# Patient Record
Sex: FEMALE | Race: WHITE | HISPANIC OR LATINO | Employment: FULL TIME | URBAN - METROPOLITAN AREA
[De-identification: names, ages, dates, MRNs, and addresses within clinical notes are randomized per-mention and may not be internally consistent; named-entity substitution may affect disease eponyms.]

---

## 2017-11-24 ENCOUNTER — APPOINTMENT (OUTPATIENT)
Dept: RADIOLOGY | Facility: MEDICAL CENTER | Age: 27
DRG: 330 | End: 2017-11-24
Attending: EMERGENCY MEDICINE
Payer: MEDICAID

## 2017-11-24 ENCOUNTER — HOSPITAL ENCOUNTER (INPATIENT)
Facility: MEDICAL CENTER | Age: 27
LOS: 5 days | DRG: 330 | End: 2017-11-30
Attending: EMERGENCY MEDICINE | Admitting: SURGERY
Payer: MEDICAID

## 2017-11-24 LAB
ALBUMIN SERPL BCP-MCNC: 3.6 G/DL (ref 3.2–4.9)
ALBUMIN/GLOB SERPL: 0.9 G/DL
ALP SERPL-CCNC: 81 U/L (ref 30–99)
ALT SERPL-CCNC: 10 U/L (ref 2–50)
ANION GAP SERPL CALC-SCNC: 10 MMOL/L (ref 0–11.9)
APPEARANCE UR: ABNORMAL
AST SERPL-CCNC: 12 U/L (ref 12–45)
BACTERIA #/AREA URNS HPF: ABNORMAL /HPF
BASOPHILS # BLD AUTO: 0.4 % (ref 0–1.8)
BASOPHILS # BLD: 0.05 K/UL (ref 0–0.12)
BILIRUB SERPL-MCNC: 0.4 MG/DL (ref 0.1–1.5)
BILIRUB UR QL STRIP.AUTO: NEGATIVE
BUN SERPL-MCNC: 9 MG/DL (ref 8–22)
CALCIUM SERPL-MCNC: 9.6 MG/DL (ref 8.5–10.5)
CHLORIDE SERPL-SCNC: 101 MMOL/L (ref 96–112)
CO2 SERPL-SCNC: 23 MMOL/L (ref 20–33)
COLOR UR: YELLOW
CREAT SERPL-MCNC: 0.64 MG/DL (ref 0.5–1.4)
EOSINOPHIL # BLD AUTO: 0.25 K/UL (ref 0–0.51)
EOSINOPHIL NFR BLD: 1.9 % (ref 0–6.9)
EPI CELLS #/AREA URNS HPF: ABNORMAL /HPF
ERYTHROCYTE [DISTWIDTH] IN BLOOD BY AUTOMATED COUNT: 41.6 FL (ref 35.9–50)
GFR SERPL CREATININE-BSD FRML MDRD: >60 ML/MIN/1.73 M 2
GLOBULIN SER CALC-MCNC: 4.2 G/DL (ref 1.9–3.5)
GLUCOSE SERPL-MCNC: 80 MG/DL (ref 65–99)
GLUCOSE UR STRIP.AUTO-MCNC: NEGATIVE MG/DL
HCG UR QL: POSITIVE
HCT VFR BLD AUTO: 37.3 % (ref 37–47)
HGB BLD-MCNC: 12.9 G/DL (ref 12–16)
HYALINE CASTS #/AREA URNS LPF: ABNORMAL /LPF
IMM GRANULOCYTES # BLD AUTO: 0.06 K/UL (ref 0–0.11)
IMM GRANULOCYTES NFR BLD AUTO: 0.5 % (ref 0–0.9)
KETONES UR STRIP.AUTO-MCNC: NEGATIVE MG/DL
LEUKOCYTE ESTERASE UR QL STRIP.AUTO: ABNORMAL
LIPASE SERPL-CCNC: 18 U/L (ref 11–82)
LYMPHOCYTES # BLD AUTO: 1.45 K/UL (ref 1–4.8)
LYMPHOCYTES NFR BLD: 11 % (ref 22–41)
MCH RBC QN AUTO: 31.7 PG (ref 27–33)
MCHC RBC AUTO-ENTMCNC: 34.6 G/DL (ref 33.6–35)
MCV RBC AUTO: 91.6 FL (ref 81.4–97.8)
MICRO URNS: ABNORMAL
MONOCYTES # BLD AUTO: 1.05 K/UL (ref 0–0.85)
MONOCYTES NFR BLD AUTO: 8 % (ref 0–13.4)
NEUTROPHILS # BLD AUTO: 10.28 K/UL (ref 2–7.15)
NEUTROPHILS NFR BLD: 78.2 % (ref 44–72)
NITRITE UR QL STRIP.AUTO: NEGATIVE
NRBC # BLD AUTO: 0 K/UL
NRBC BLD AUTO-RTO: 0 /100 WBC
PH UR STRIP.AUTO: 7.5 [PH]
PLATELET # BLD AUTO: 217 K/UL (ref 164–446)
PMV BLD AUTO: 12 FL (ref 9–12.9)
POTASSIUM SERPL-SCNC: 3.3 MMOL/L (ref 3.6–5.5)
PROT SERPL-MCNC: 7.8 G/DL (ref 6–8.2)
PROT UR QL STRIP: NEGATIVE MG/DL
RBC # BLD AUTO: 4.07 M/UL (ref 4.2–5.4)
RBC # URNS HPF: ABNORMAL /HPF
RBC UR QL AUTO: NEGATIVE
SODIUM SERPL-SCNC: 134 MMOL/L (ref 135–145)
SP GR UR REFRACTOMETRY: 1
SP GR UR STRIP.AUTO: 1.01
UROBILINOGEN UR STRIP.AUTO-MCNC: 0.2 MG/DL
WBC # BLD AUTO: 13.1 K/UL (ref 4.8–10.8)
WBC #/AREA URNS HPF: ABNORMAL /HPF

## 2017-11-24 PROCEDURE — 96374 THER/PROPH/DIAG INJ IV PUSH: CPT

## 2017-11-24 PROCEDURE — 501571 HCHG TROCAR, SEPARATOR 12X100: Performed by: SURGERY

## 2017-11-24 PROCEDURE — 501583 HCHG TROCAR, THRD CAN&SEAL 5X100: Performed by: SURGERY

## 2017-11-24 PROCEDURE — 160048 HCHG OR STATISTICAL LEVEL 1-5: Performed by: SURGERY

## 2017-11-24 PROCEDURE — 500514 HCHG ENDOCLIP: Performed by: SURGERY

## 2017-11-24 PROCEDURE — 700101 HCHG RX REV CODE 250

## 2017-11-24 PROCEDURE — 160002 HCHG RECOVERY MINUTES (STAT): Performed by: SURGERY

## 2017-11-24 PROCEDURE — 501570 HCHG TROCAR, SEPARATOR: Performed by: SURGERY

## 2017-11-24 PROCEDURE — 88307 TISSUE EXAM BY PATHOLOGIST: CPT

## 2017-11-24 PROCEDURE — 85025 COMPLETE CBC W/AUTO DIFF WBC: CPT

## 2017-11-24 PROCEDURE — 160009 HCHG ANES TIME/MIN: Performed by: SURGERY

## 2017-11-24 PROCEDURE — 160028 HCHG SURGERY MINUTES - 1ST 30 MINS LEVEL 3: Performed by: SURGERY

## 2017-11-24 PROCEDURE — 501463 HCHG STAPLES, UNIV. ROTIC: Performed by: SURGERY

## 2017-11-24 PROCEDURE — 501838 HCHG SUTURE GENERAL: Performed by: SURGERY

## 2017-11-24 PROCEDURE — 502571 HCHG PACK, LAP CHOLE: Performed by: SURGERY

## 2017-11-24 PROCEDURE — 160035 HCHG PACU - 1ST 60 MINS PHASE I: Performed by: SURGERY

## 2017-11-24 PROCEDURE — 700101 HCHG RX REV CODE 250: Performed by: EMERGENCY MEDICINE

## 2017-11-24 PROCEDURE — 76815 OB US LIMITED FETUS(S): CPT

## 2017-11-24 PROCEDURE — 700111 HCHG RX REV CODE 636 W/ 250 OVERRIDE (IP)

## 2017-11-24 PROCEDURE — A6402 STERILE GAUZE <= 16 SQ IN: HCPCS | Performed by: SURGERY

## 2017-11-24 PROCEDURE — 160003 HCHG RECOVERY MINUTES (EXTENDED) STAT: Performed by: SURGERY

## 2017-11-24 PROCEDURE — 160039 HCHG SURGERY MINUTES - EA ADDL 1 MIN LEVEL 3: Performed by: SURGERY

## 2017-11-24 PROCEDURE — 501435 HCHG STAPLER, LINEAR 60: Performed by: SURGERY

## 2017-11-24 PROCEDURE — 36415 COLL VENOUS BLD VENIPUNCTURE: CPT

## 2017-11-24 PROCEDURE — 501452 HCHG STAPLES, GIA MULTIFIRE 60/80: Performed by: SURGERY

## 2017-11-24 PROCEDURE — 83690 ASSAY OF LIPASE: CPT

## 2017-11-24 PROCEDURE — 87086 URINE CULTURE/COLONY COUNT: CPT

## 2017-11-24 PROCEDURE — 80053 COMPREHEN METABOLIC PANEL: CPT

## 2017-11-24 PROCEDURE — 501433 HCHG STAPLER, GIA MULTIFIRE 60/80: Performed by: SURGERY

## 2017-11-24 PROCEDURE — 99291 CRITICAL CARE FIRST HOUR: CPT

## 2017-11-24 PROCEDURE — 160036 HCHG PACU - EA ADDL 30 MINS PHASE I: Performed by: SURGERY

## 2017-11-24 PROCEDURE — 501399 HCHG SPECIMAN BAG, ENDO CATC: Performed by: SURGERY

## 2017-11-24 PROCEDURE — 500868 HCHG NEEDLE, SURGI(VARES): Performed by: SURGERY

## 2017-11-24 PROCEDURE — 81025 URINE PREGNANCY TEST: CPT

## 2017-11-24 PROCEDURE — 81001 URINALYSIS AUTO W/SCOPE: CPT

## 2017-11-24 RX ORDER — CEFOTETAN DISODIUM 1 G/10ML
1 INJECTION, POWDER, FOR SOLUTION INTRAMUSCULAR; INTRAVENOUS ONCE
Status: COMPLETED | OUTPATIENT
Start: 2017-11-24 | End: 2017-11-24

## 2017-11-24 RX ORDER — MAGNESIUM HYDROXIDE 1200 MG/15ML
LIQUID ORAL
Status: DISCONTINUED | OUTPATIENT
Start: 2017-11-24 | End: 2017-11-25

## 2017-11-24 RX ADMIN — CEFOTETAN DISODIUM 1 G: 1 INJECTION, POWDER, FOR SOLUTION INTRAMUSCULAR; INTRAVENOUS at 22:33

## 2017-11-24 ASSESSMENT — PAIN SCALES - GENERAL: PAINLEVEL_OUTOF10: 8

## 2017-11-25 PROBLEM — K35.33 APPENDICITIS WITH ABSCESS: Status: ACTIVE | Noted: 2017-11-25

## 2017-11-25 PROCEDURE — A9270 NON-COVERED ITEM OR SERVICE: HCPCS

## 2017-11-25 PROCEDURE — 0DTH0ZZ RESECTION OF CECUM, OPEN APPROACH: ICD-10-PCS | Performed by: SURGERY

## 2017-11-25 PROCEDURE — 700102 HCHG RX REV CODE 250 W/ 637 OVERRIDE(OP): Performed by: NURSE PRACTITIONER

## 2017-11-25 PROCEDURE — 0DJD4ZZ INSPECTION OF LOWER INTESTINAL TRACT, PERCUTANEOUS ENDOSCOPIC APPROACH: ICD-10-PCS | Performed by: SURGERY

## 2017-11-25 PROCEDURE — 700102 HCHG RX REV CODE 250 W/ 637 OVERRIDE(OP)

## 2017-11-25 PROCEDURE — 770006 HCHG ROOM/CARE - MED/SURG/GYN SEMI*

## 2017-11-25 PROCEDURE — A9270 NON-COVERED ITEM OR SERVICE: HCPCS | Performed by: NURSE PRACTITIONER

## 2017-11-25 PROCEDURE — 700105 HCHG RX REV CODE 258: Performed by: NURSE PRACTITIONER

## 2017-11-25 PROCEDURE — 700111 HCHG RX REV CODE 636 W/ 250 OVERRIDE (IP): Performed by: NURSE PRACTITIONER

## 2017-11-25 PROCEDURE — 700111 HCHG RX REV CODE 636 W/ 250 OVERRIDE (IP)

## 2017-11-25 PROCEDURE — 94760 N-INVAS EAR/PLS OXIMETRY 1: CPT

## 2017-11-25 PROCEDURE — 0DTB0ZZ RESECTION OF ILEUM, OPEN APPROACH: ICD-10-PCS | Performed by: SURGERY

## 2017-11-25 PROCEDURE — 0DTJ0ZZ RESECTION OF APPENDIX, OPEN APPROACH: ICD-10-PCS | Performed by: SURGERY

## 2017-11-25 RX ORDER — SODIUM CHLORIDE 9 MG/ML
INJECTION, SOLUTION INTRAVENOUS
Status: ACTIVE
Start: 2017-11-25 | End: 2017-11-25

## 2017-11-25 RX ORDER — ONDANSETRON 2 MG/ML
4 INJECTION INTRAMUSCULAR; INTRAVENOUS EVERY 4 HOURS PRN
Status: DISCONTINUED | OUTPATIENT
Start: 2017-11-25 | End: 2017-11-30 | Stop reason: HOSPADM

## 2017-11-25 RX ORDER — DEXAMETHASONE SODIUM PHOSPHATE 4 MG/ML
4 INJECTION, SOLUTION INTRA-ARTICULAR; INTRALESIONAL; INTRAMUSCULAR; INTRAVENOUS; SOFT TISSUE
Status: DISCONTINUED | OUTPATIENT
Start: 2017-11-25 | End: 2017-11-30 | Stop reason: HOSPADM

## 2017-11-25 RX ORDER — DIPHENHYDRAMINE HYDROCHLORIDE 50 MG/ML
25 INJECTION INTRAMUSCULAR; INTRAVENOUS EVERY 6 HOURS PRN
Status: DISCONTINUED | OUTPATIENT
Start: 2017-11-25 | End: 2017-11-30 | Stop reason: HOSPADM

## 2017-11-25 RX ORDER — MORPHINE SULFATE 4 MG/ML
1-4 INJECTION, SOLUTION INTRAMUSCULAR; INTRAVENOUS
Status: DISCONTINUED | OUTPATIENT
Start: 2017-11-25 | End: 2017-11-30 | Stop reason: HOSPADM

## 2017-11-25 RX ORDER — SODIUM CHLORIDE 9 MG/ML
INJECTION, SOLUTION INTRAVENOUS CONTINUOUS
Status: DISCONTINUED | OUTPATIENT
Start: 2017-11-25 | End: 2017-11-30 | Stop reason: HOSPADM

## 2017-11-25 RX ORDER — ACETAMINOPHEN 500 MG
1000 TABLET ORAL EVERY 6 HOURS
Status: DISCONTINUED | OUTPATIENT
Start: 2017-11-25 | End: 2017-11-25

## 2017-11-25 RX ORDER — HALOPERIDOL 5 MG/ML
1 INJECTION INTRAMUSCULAR EVERY 6 HOURS PRN
Status: DISCONTINUED | OUTPATIENT
Start: 2017-11-25 | End: 2017-11-30 | Stop reason: HOSPADM

## 2017-11-25 RX ORDER — ACETAMINOPHEN 500 MG
1000 TABLET ORAL EVERY 6 HOURS
Status: DISPENSED | OUTPATIENT
Start: 2017-11-25 | End: 2017-11-30

## 2017-11-25 RX ORDER — SODIUM CHLORIDE 9 MG/ML
INJECTION, SOLUTION INTRAVENOUS
Status: ACTIVE
Start: 2017-11-25 | End: 2017-11-26

## 2017-11-25 RX ORDER — SCOLOPAMINE TRANSDERMAL SYSTEM 1 MG/1
1 PATCH, EXTENDED RELEASE TRANSDERMAL
Status: DISCONTINUED | OUTPATIENT
Start: 2017-11-25 | End: 2017-11-30 | Stop reason: HOSPADM

## 2017-11-25 RX ORDER — ACETAMINOPHEN 500 MG
TABLET ORAL
Status: COMPLETED
Start: 2017-11-25 | End: 2017-11-25

## 2017-11-25 RX ORDER — OXYCODONE HYDROCHLORIDE 5 MG/1
5 TABLET ORAL
Status: DISCONTINUED | OUTPATIENT
Start: 2017-11-25 | End: 2017-11-30 | Stop reason: HOSPADM

## 2017-11-25 RX ORDER — HYDROMORPHONE HYDROCHLORIDE 2 MG/ML
INJECTION, SOLUTION INTRAMUSCULAR; INTRAVENOUS; SUBCUTANEOUS
Status: COMPLETED
Start: 2017-11-25 | End: 2017-11-25

## 2017-11-25 RX ORDER — MEPERIDINE HYDROCHLORIDE 50 MG/ML
INJECTION INTRAMUSCULAR; INTRAVENOUS; SUBCUTANEOUS
Status: COMPLETED
Start: 2017-11-25 | End: 2017-11-25

## 2017-11-25 RX ORDER — BUPIVACAINE HYDROCHLORIDE AND EPINEPHRINE 5; 5 MG/ML; UG/ML
INJECTION, SOLUTION EPIDURAL; INTRACAUDAL; PERINEURAL
Status: DISCONTINUED | OUTPATIENT
Start: 2017-11-24 | End: 2017-11-25

## 2017-11-25 RX ADMIN — MEPERIDINE HYDROCHLORIDE 25 MG: 50 INJECTION INTRAMUSCULAR; INTRAVENOUS; SUBCUTANEOUS at 01:18

## 2017-11-25 RX ADMIN — MORPHINE SULFATE 2 MG: 4 INJECTION INTRAVENOUS at 16:11

## 2017-11-25 RX ADMIN — HYDROMORPHONE HYDROCHLORIDE 0.5 MG: 2 INJECTION INTRAMUSCULAR; INTRAVENOUS; SUBCUTANEOUS at 00:50

## 2017-11-25 RX ADMIN — CEFTRIAXONE 2 G: 2 INJECTION, POWDER, FOR SOLUTION INTRAMUSCULAR; INTRAVENOUS at 11:53

## 2017-11-25 RX ADMIN — MORPHINE SULFATE 2 MG: 4 INJECTION INTRAVENOUS at 21:03

## 2017-11-25 RX ADMIN — OXYCODONE HYDROCHLORIDE 2.5 MG: 5 TABLET ORAL at 09:51

## 2017-11-25 RX ADMIN — MORPHINE SULFATE 2 MG: 4 INJECTION INTRAVENOUS at 18:33

## 2017-11-25 RX ADMIN — MORPHINE SULFATE 2 MG: 4 INJECTION INTRAVENOUS at 14:19

## 2017-11-25 RX ADMIN — SODIUM CHLORIDE: 9 INJECTION, SOLUTION INTRAVENOUS at 21:11

## 2017-11-25 RX ADMIN — SODIUM CHLORIDE: 9 INJECTION, SOLUTION INTRAVENOUS at 03:39

## 2017-11-25 RX ADMIN — HYDROMORPHONE HYDROCHLORIDE 0.5 MG: 2 INJECTION INTRAMUSCULAR; INTRAVENOUS; SUBCUTANEOUS at 00:57

## 2017-11-25 RX ADMIN — MEPERIDINE HYDROCHLORIDE 25 MG: 50 INJECTION INTRAMUSCULAR; INTRAVENOUS; SUBCUTANEOUS at 01:45

## 2017-11-25 RX ADMIN — ONDANSETRON 4 MG: 2 INJECTION INTRAMUSCULAR; INTRAVENOUS at 12:52

## 2017-11-25 RX ADMIN — ACETAMINOPHEN 1000 MG: 500 TABLET, FILM COATED ORAL at 02:31

## 2017-11-25 RX ADMIN — HYDROMORPHONE HYDROCHLORIDE 0.5 MG: 2 INJECTION INTRAMUSCULAR; INTRAVENOUS; SUBCUTANEOUS at 01:07

## 2017-11-25 RX ADMIN — HYDROMORPHONE HYDROCHLORIDE 0.5 MG: 2 INJECTION INTRAMUSCULAR; INTRAVENOUS; SUBCUTANEOUS at 00:42

## 2017-11-25 RX ADMIN — ACETAMINOPHEN 1000 MG: 500 TABLET ORAL at 05:10

## 2017-11-25 RX ADMIN — FENTANYL CITRATE 50 MCG: 50 INJECTION, SOLUTION INTRAMUSCULAR; INTRAVENOUS at 00:29

## 2017-11-25 RX ADMIN — ONDANSETRON 4 MG: 2 INJECTION INTRAMUSCULAR; INTRAVENOUS at 03:52

## 2017-11-25 RX ADMIN — FENTANYL CITRATE 50 MCG: 50 INJECTION, SOLUTION INTRAMUSCULAR; INTRAVENOUS at 00:37

## 2017-11-25 RX ADMIN — OXYCODONE HYDROCHLORIDE 5 MG: 5 TABLET ORAL at 12:38

## 2017-11-25 RX ADMIN — ACETAMINOPHEN 1000 MG: 500 TABLET ORAL at 11:52

## 2017-11-25 RX ADMIN — SODIUM CHLORIDE: 9 INJECTION, SOLUTION INTRAVENOUS at 12:46

## 2017-11-25 ASSESSMENT — PATIENT HEALTH QUESTIONNAIRE - PHQ9
2. FEELING DOWN, DEPRESSED, IRRITABLE, OR HOPELESS: NOT AT ALL
1. LITTLE INTEREST OR PLEASURE IN DOING THINGS: NOT AT ALL
SUM OF ALL RESPONSES TO PHQ QUESTIONS 1-9: 0
SUM OF ALL RESPONSES TO PHQ9 QUESTIONS 1 AND 2: 0

## 2017-11-25 ASSESSMENT — PAIN SCALES - GENERAL
PAINLEVEL_OUTOF10: 3
PAINLEVEL_OUTOF10: 7
PAINLEVEL_OUTOF10: 8
PAINLEVEL_OUTOF10: 6
PAINLEVEL_OUTOF10: ASSUMED PAIN PRESENT
PAINLEVEL_OUTOF10: 4
PAINLEVEL_OUTOF10: 8
PAINLEVEL_OUTOF10: 7
PAINLEVEL_OUTOF10: 6
PAINLEVEL_OUTOF10: 5
PAINLEVEL_OUTOF10: 4
PAINLEVEL_OUTOF10: 6
PAINLEVEL_OUTOF10: 3
PAINLEVEL_OUTOF10: 8
PAINLEVEL_OUTOF10: 8
PAINLEVEL_OUTOF10: 3
PAINLEVEL_OUTOF10: 5
PAINLEVEL_OUTOF10: 4
PAINLEVEL_OUTOF10: 7
PAINLEVEL_OUTOF10: 4
PAINLEVEL_OUTOF10: 5

## 2017-11-25 ASSESSMENT — LIFESTYLE VARIABLES
EVER_SMOKED: NEVER
DO YOU DRINK ALCOHOL: NO
ALCOHOL_USE: NO

## 2017-11-25 NOTE — ED PROVIDER NOTES
ED Provider Note    Scribed for Mauri Alves M.D. by Kun Slater. 11/24/2017, 8:41 PM.    Primary care provider: Pcp Pt States None  Means of arrival: Walk in  History obtained from: Patient  History limited by: None    CHIEF COMPLAINT  Chief Complaint   Patient presents with   • RLQ Pain     x4 days, denies any vaginal bleeding   • Pregnancy     LMP August 23       HPI  Michelle Lima is a 27 y.o. female who presents to the Emergency Department complaining of sharp lower abdominal pain over the last 4 days. Patient is currently pregnant. Nursing note states her last menstrual period was 8/23/17. She states she has not yet had an ultrasound evaluation for her pregnancy. Patient states the abdominal pain is primarily to the the right side of her abdomen. She notes the pain is exacerbated with urination and with sudden movements such as when turning her body while laying down. Patient reports having constipation and one associated episode of nausea and vomiting. Negative fever, vision change, sore throat, chest pain, shortness of breath, dysuria, focal muscle pain, rashes, or neurologic deficits. Patient notes she has been trying to drink at least 2 L of water each day.    REVIEW OF SYSTEMS  Positive: Bilateral lower abdominal pain, constipation, nausea and vomiting (currently resolved)  Patient denies fever, vision change, sore throat, chest pain, shortness of breath, dysuria, focal muscle pain, rashes, or neurologic deficits.   C    PAST MEDICAL HISTORY   None noted    SURGICAL HISTORY  patient denies any surgical history    SOCIAL HISTORY  Social History   Substance Use Topics   • Smoking status: Never Smoker   • Smokeless tobacco: Never Used   • Alcohol use No      History   Drug Use No       FAMILY HISTORY  None reported    CURRENT MEDICATIONS  Reviewed.  See Encounter Summary.     ALLERGIES  No Known Allergies    PHYSICAL EXAM  VITAL SIGNS: /63   Pulse 76   Temp 36.7 °C (98.1 °F)  "(Temporal)   Resp 16   Ht 1.626 m (5' 4\")   Wt 60.4 kg (133 lb 2.5 oz)   LMP 08/23/2017 (Approximate)   SpO2 100%   BMI 22.86 kg/m²    Pulse ox interpretation: I interpret this pulse ox as normal.  Constitutional: Alert in no apparent distress. Very well appearing.  HENT: Head normocephalic, atraumatic, Bilateral external ears normal, Nose normal.  Eyes: Pupils are equal, extraocular movements intact, Conjunctiva normal, Non-icteric.   Neck: Normal range of motion, Supple, No stridor.   Lymphatic: No lymphadenopathy noted.   Cardiovascular: Regular rate and rhythm   Thorax & Lungs: No acute respiratory distress, No wheezing, No increased work of breathing.   Abdomen: Soft. Bilateral lower quadrant tenderness, right greater than left. Slight voluntary guarding. No rebound. Non-distended, No pulsatile masses, No peritoneal signs.  Skin: Warm, Dry, No erythema, No rash.   Back: No bony tenderness, No CVA tenderness.   Extremities: Intact distal pulses, No edema, No tenderness, No cyanosis.    Musculoskeletal: Good range of motion in all major joints. No tenderness to palpation or major deformities noted.   Neurologic: Alert , Normal motor function, Normal sensory function, No focal deficits noted.   Psychiatric: Affect normal, Judgment normal, Mood normal.     DIAGNOSTIC STUDIES / PROCEDURES     LABS  Results for orders placed or performed during the hospital encounter of 11/24/17   URINALYSIS   Result Value Ref Range    Color Yellow     Character Cloudy (A)     Specific Gravity 1.006 <1.035    Ph 7.5 5.0 - 8.0    Glucose Negative Negative mg/dL    Ketones Negative Negative mg/dL    Protein Negative Negative mg/dL    Bilirubin Negative Negative    Urobilinogen, Urine 0.2 Negative    Nitrite Negative Negative    Leukocyte Esterase Large (A) Negative    Occult Blood Negative Negative    Micro Urine Req Microscopic    CBC WITH DIFFERENTIAL   Result Value Ref Range    WBC 13.1 (H) 4.8 - 10.8 K/uL    RBC 4.07 (L) 4.20 " - 5.40 M/uL    Hemoglobin 12.9 12.0 - 16.0 g/dL    Hematocrit 37.3 37.0 - 47.0 %    MCV 91.6 81.4 - 97.8 fL    MCH 31.7 27.0 - 33.0 pg    MCHC 34.6 33.6 - 35.0 g/dL    RDW 41.6 35.9 - 50.0 fL    Platelet Count 217 164 - 446 K/uL    MPV 12.0 9.0 - 12.9 fL    Neutrophils-Polys 78.20 (H) 44.00 - 72.00 %    Lymphocytes 11.00 (L) 22.00 - 41.00 %    Monocytes 8.00 0.00 - 13.40 %    Eosinophils 1.90 0.00 - 6.90 %    Basophils 0.40 0.00 - 1.80 %    Immature Granulocytes 0.50 0.00 - 0.90 %    Nucleated RBC 0.00 /100 WBC    Neutrophils (Absolute) 10.28 (H) 2.00 - 7.15 K/uL    Lymphs (Absolute) 1.45 1.00 - 4.80 K/uL    Monos (Absolute) 1.05 (H) 0.00 - 0.85 K/uL    Eos (Absolute) 0.25 0.00 - 0.51 K/uL    Baso (Absolute) 0.05 0.00 - 0.12 K/uL    Immature Granulocytes (abs) 0.06 0.00 - 0.11 K/uL    NRBC (Absolute) 0.00 K/uL   COMP METABOLIC PANEL   Result Value Ref Range    Sodium 134 (L) 135 - 145 mmol/L    Potassium 3.3 (L) 3.6 - 5.5 mmol/L    Chloride 101 96 - 112 mmol/L    Co2 23 20 - 33 mmol/L    Anion Gap 10.0 0.0 - 11.9    Glucose 80 65 - 99 mg/dL    Bun 9 8 - 22 mg/dL    Creatinine 0.64 0.50 - 1.40 mg/dL    Calcium 9.6 8.5 - 10.5 mg/dL    AST(SGOT) 12 12 - 45 U/L    ALT(SGPT) 10 2 - 50 U/L    Alkaline Phosphatase 81 30 - 99 U/L    Total Bilirubin 0.4 0.1 - 1.5 mg/dL    Albumin 3.6 3.2 - 4.9 g/dL    Total Protein 7.8 6.0 - 8.2 g/dL    Globulin 4.2 (H) 1.9 - 3.5 g/dL    A-G Ratio 0.9 g/dL   LIPASE   Result Value Ref Range    Lipase 18 11 - 82 U/L   URINE MICROSCOPIC (W/UA)   Result Value Ref Range    WBC 20-50 (A) /hpf    RBC 0-2 /hpf    Bacteria Moderate (A) None /hpf    Epithelial Cells Many (A) /hpf    Hyaline Cast 3-5 (A) /lpf   ESTIMATED GFR   Result Value Ref Range    GFR If African American >60 >60 mL/min/1.73 m 2    GFR If Non African American >60 >60 mL/min/1.73 m 2   BETA-HCG QUALITATIVE URINE   Result Value Ref Range    Beta-Hcg Urine Positive (A) Negative   REFRACTOMETER SG   Result Value Ref Range    Specific  Gravity 1.004       All labs were reviewed by me.    RADIOLOGY  US-OB LIMITED TRANSABDOMINAL   Final Result      1.  Single intrauterine pregnancy of an estimated gestational age of Average 14w 2d with an estimated date of delivery of 5/23/2018.      2.  Maternal acute appendicitis with probable rupture      Findings were discussed with SHANE ROSAS on 11/24/2017 9:52 PM.        The radiologist's interpretation of all radiological studies have been reviewed by me.    COURSE & MEDICAL DECISION MAKING  Pertinent Labs & Imaging studies reviewed. (See chart for details)    8:41 PM - Patient seen and examined at bedside. Informed patient she has a urine infection. Discussed plan of care which includes ultrasound evaluation and prescribing antibiotics. Patient understands and agrees. Ordered US OB pelvis transvaginal, urine culture, beta-HCG qualitative urine, CBC, CMP, lipase, estimated GFR, urinalysis, urine microscopic, refractometer SG to evaluate her symptoms.     9:50 PM - I discussed the patient's case and the above findings with radiology who reports patient has probable ruptured appendicitis.    9:51 PM Paged surgery    9:52 PM Patient reevaluated at bedside. Discussed radiology results as seen above which shows probable ruptured appendicitis. Discussed plan of care which includes contacting surgery. Patient understands and agrees.     10:16 PM - I discussed the patient's case and the above findings with Dr. Smith (surgery) who will take patient to OR.     Decision Making:  This is a 27 y.o. year old female who presents withPatient presenting here for evaluation of right lower quadrant pain, some associated constipation, and fatigue. Urine examination the patient had pretty significant right lower quadrant pain, and considerations included urinary tract infection, round ligament pain, and acute appendicitis. Urine ultrasound the patient has evidence of acute appendicitis. Given this, I spoke with Dr. Smith,  who stated that he would take the patient to the operating room for appendectomy.    DISPOSITION:  Patient will be admitted to Dr. Smith (surgery) in guarded condition.     FINAL IMPRESSION  1. Acute appendicitis  2. History of 14 week pregnancy      Kun CAMACHO (Scribe), am scribing for, and in the presence of, Mauri Alves M.D..    Electronically signed by: Kun Slater (Scribe), 11/24/2017    Mauri CAMACHO M.D. personally performed the services described in this documentation, as scribed by Kun Slater in my presence, and it is both accurate and complete.    The note accurately reflects work and decisions made by me.  Mauri Alves  11/25/2017  1:35 AM

## 2017-11-25 NOTE — OR NURSING
Pt's heart rate has stayed <100 at all times since 0200.  Pt states no more back pain and her incisional right sided abdominal pain is no more than 4/10.  Heart rate now is 85 and remains sinus rhythm.

## 2017-11-25 NOTE — CARE PLAN
Problem: Communication  Goal: The ability to communicate needs accurately and effectively will improve  Outcome: PROGRESSING AS EXPECTED  Discussed POC at time of arrival on unit from PACU. All questions answered.     Problem: Pain Management  Goal: Pain level will decrease to patient's comfort goal  Consistent pain scale used.

## 2017-11-25 NOTE — PROGRESS NOTES
Awake, alert. Ambulating in halls   Reports incisional pain   Abdomen soft  Bandages dry     Clear liquids   Routine post-op care

## 2017-11-25 NOTE — PROGRESS NOTES
Pt arrived to unit from PACU, ambulted from door to bed # 2. A&Ox 4. SBA assist. 1 L oxygen, sating at 97%. Moves all extremities, Denies numbness or tingling. Hypoactive BS, denies flatus, LBM PTA. NPO, ice chips OK, sips with meds  diet, + nausea medicated per MAR. Wound(s): Right surgical incision gauze and transparent, CDI lap-stab x3, gauze and transparent, CDI. Abdomen is soft and tender. Patient reports 6 out of 10 pain, previously medicated per MAR. Pt is wearing treaded slipper socks, IV pole is on the same side as the bathroom, lower bedrails are down. Pt calls appropriatly. Discussed POC, all questions answered at this time. Bed is locked and in the lowest position, call light within reach, Q 2 hour rounding in place.

## 2017-11-25 NOTE — ED NOTES
"Patient ambulatory from triage to R5. Patient alert and oriented x 4. Patient changed into gown and placed on cardiac monitor, auto BP, and SpO2 probe.     Patient states she has had intermittent bilateral lower abdominal pain x 2 weeks. Patient states pain significantly worse the past 4 days. Patient also states she has had dysuria, frequency, and hesitancy. Patient states her urine appears cloudy but does not state any foul odor. Patient states pain is 8/10, intermittent, and described as \"pulling and stabbing\" as well as \"pressure\".     Patient states she thinks she is approximately 12 weeks pregnant and states she is .   "

## 2017-11-25 NOTE — H&P
Date of Service:  11-24-17    PCP: Pcp Pt States None    CC:  Abdominal pain    HPI: This is a 27 y.o. female with RLQ abdominal pain. Patient has between 1214 weeks pregnant. Patient reports that she developed abdominal pain in the right side of her abdomen about 4 days ago. Patient reports that the pain is increased over the last several days. She's had one episode of vomiting.    ROS: As above. The remainder of a complete review of systems is negative in all systems except as noted.    PMHx:  Active Ambulatory Problems     Diagnosis Date Noted   • No Active Ambulatory Problems     Resolved Ambulatory Problems     Diagnosis Date Noted   • No Resolved Ambulatory Problems     No Additional Past Medical History       SHx:  Social History     Social History   • Marital status: Single     Spouse name: N/A   • Number of children: N/A   • Years of education: N/A     Occupational History   • Not on file.     Social History Main Topics   • Smoking status: Never Smoker   • Smokeless tobacco: Never Used   • Alcohol use No   • Drug use: No   • Sexual activity: Not on file     Other Topics Concern   • Not on file     Social History Narrative   • No narrative on file       FHx:  family history is not on file.    Allergies:  No Known Allergies    Medications:  No current facility-administered medications on file prior to encounter.      No current outpatient prescriptions on file prior to encounter.       Objective Exam:  Vitals:    11/24/17 1808 11/24/17 2100 11/24/17 2200 11/24/17 2225   BP:       Pulse:  75 83 80   Resp:  (!) 21 (!) 22 (!) 22   Temp:       TempSrc:       SpO2:  98% 99% 98%   Weight: 60.4 kg (133 lb 2.5 oz)      Height:           Review of Systems  Negative except forRight sided abdominal pain nausea and vomiting      General: Awake and Alert  HEENT: normocephalic, atraumatic  Chest: Clear and equal bilaterally  CV: RRR  Abd: Soft, non-tender, nondistended  Ext: Warm, well perfused   Vasc:   Neuro:  Intact  Skin: normal    Laboratory--reviewed personally and are as follows:  Lab Results   Component Value Date/Time    WBC 13.1 (H) 11/24/2017 06:16 PM    RBC 4.07 (L) 11/24/2017 06:16 PM    HEMOGLOBIN 12.9 11/24/2017 06:16 PM    HEMATOCRIT 37.3 11/24/2017 06:16 PM    MCV 91.6 11/24/2017 06:16 PM    MCH 31.7 11/24/2017 06:16 PM    MCHC 34.6 11/24/2017 06:16 PM    MPV 12.0 11/24/2017 06:16 PM    NEUTSPOLYS 78.20 (H) 11/24/2017 06:16 PM    LYMPHOCYTES 11.00 (L) 11/24/2017 06:16 PM    MONOCYTES 8.00 11/24/2017 06:16 PM    EOSINOPHILS 1.90 11/24/2017 06:16 PM    BASOPHILS 0.40 11/24/2017 06:16 PM      Lab Results   Component Value Date/Time    SODIUM 134 (L) 11/24/2017 06:16 PM    POTASSIUM 3.3 (L) 11/24/2017 06:16 PM    CHLORIDE 101 11/24/2017 06:16 PM    CO2 23 11/24/2017 06:16 PM    GLUCOSE 80 11/24/2017 06:16 PM    BUN 9 11/24/2017 06:16 PM    CREATININE 0.64 11/24/2017 06:16 PM      No results found for: PROTHROMBTM, INR     Radiology  Ultrasound of the patient's abdomen demonstrates findings consistent with acute appendicitis suspected ruptured    MDM:  27 y.o. pregnant female here with right sided abdominal pain and ultrasound which demonstrates acute appendicitis.   Assessment/Plan: Acute appendicitis    Plan-laparoscopic possible open appendectomy. Risks benefits and alternatives discussed with the patient.

## 2017-11-25 NOTE — PROGRESS NOTES
0645 Received report, introduced self to pt, reviewed labs, orders, allergies, code status    1100 pt ambulating, steady gait, minor complaints of pain, states feels better as she walks. Will move pt to clear liquids per Dr Smith at bedside.     1115 pt vomited 50mL of clear/yellow liquid, states she feels much better after vomiting.

## 2017-11-25 NOTE — OP REPORT
11/25/17    OPERATIVE NOTE    PRE-OPERATIVE DIAGNOSIS - Appendicitis with phlegmon     POST-OPERATIVE DIAGNOSIS - Same    PROCEDURE PERFORMED - Attempted laparoscopic appendectomy converted to open ileocecectomy     SURGEON - Pascual Smith M.D.    ASSISTANT - KATE Giron    TYPE OF ANESTHESIA - General     ANESTHESIOLOGIST  - Zhang      SPECIMENS - Cecum/appendix    INDICATIONS - 27 y.o. female who presents to the emergency department with a 4-5 day history of abdominal pain. The patient's 12-14 weeks pregnant. Workup included an ultrasound of the patient's abdomen which demonstrated a phlegmon in the right lower quadrant consistent with a ruptured appendix. This is being taken to the operating room for attempted laparoscopic possible open appendectomy.      PROCEDURE IN DETAIL -patient was taken to the operative suite placed in the supine position after adequate anesthesia the patient's abdomen was prepped and draped in sterile fashion. Half percent Marcaine with epinephrine was infiltrated into the subcutaneous tissue the periumbilical space. Small incision was made in varus and it was inserted into the peritoneal cavity insufflated with abdomen to 15 mmHg pressure. A 5 mm trocar was inserted. General inspection of the peritoneal cavity demonstrated the phlegmon of the right lower quadrant region of the cecum. 2 additional trochars were placed a 12 mm trocar was placed in the suprapubic region and a 5 mm trocar was placed in the infraumbilical region both under direct vision. With those ports in place attempted to identify the appendix within this phlegmon. The ileum was densely adhered to the phlegmon as well as the appendix and cecum. The appendix could not be visualized and after an attempt to mobilize the ileum also the inflamed appendix in the region of the phlegmon it was quite clear that this was not be successful.  Incision was made in the right lower quadrant. Using a muscle-splitting technique  mobilizing and retracting the rectus abdominal muscle medially was entered. After opening the incision was able to palpate the phlegmon and the appendix was densely adhered to the pelvic sidewall. Using blunt dissection the phlegmon in appendix was mobilized from its adherence to the sidewall and was delivered through the incision. On inspection of the appendix the appendix was broad-based around 3-4 cm and was densely adhered to the terminal ileum. The entire cecum and terminal ileum was incorporated in this phlegmon was felt to be safest to resect the cecum terminal ileum along with the inflamed appendix. GI stapler was then used to transect the distal ileum as well as the proximal ascending colon just distal to the cecum. The mesentery was ligated between clamps and ties. The specimen was then passed off. Using a side-to-side functional end-to-end stapled anastomosis and terminal ileum was anastomosed to the proximal descending colon using a LAUREN stapler followed by a TA 60 stapler. After the anastomosis was checked and it was then reduced within the peritoneal cavity. The area was irrigated with copious amounts of normal saline. There was actually minimal contamination during the case. Once the area was irrigated and checked for hemostasis the fascia was reapproximated in 2 layers using #1 Vicryl sutures. 3-0 Vicryl subcutaneous sutures were placed followed by 4.0 stratafix. Benzoin and Steri-Strips sterile dressings were applied. 4-0 Monocryl was used to close the portal incisions and sterile dressings were applied.      Pascual Smith M.D.

## 2017-11-25 NOTE — OR NURSING
Pt is reporting pain in left lower back in addition to pain on right side of abdomen.  Checked back site and is without blemish or swelling.  Pt to be given meperidine to hopefully facilitate better pain control on incisional sites.

## 2017-11-25 NOTE — ED NOTES
Ambulates to triage  Chief Complaint   Patient presents with   • RLQ Pain     x4 days, denies any vaginal bleeding   • Pregnancy     LMP August 23     Denies any fevers, has not seen an OBGYN for this pregnancy, has an appt on Monday at the pregnancy [facility].  Urine sample obtained and sent to lab.

## 2017-11-25 NOTE — OR NURSING
Spoke with Dr. Smith and he said ok to floor with rob and can take medications with sips of water.

## 2017-11-25 NOTE — OR NURSING
Pt awake and now having signifcant pain of 7/10 after initially having no pain when she first woke up.

## 2017-11-26 LAB
APPEARANCE UR: CLEAR
BACTERIA UR CULT: ABNORMAL
BACTERIA UR CULT: ABNORMAL
BILIRUB UR QL STRIP.AUTO: NEGATIVE
COLOR UR: YELLOW
GLUCOSE UR STRIP.AUTO-MCNC: NEGATIVE MG/DL
KETONES UR STRIP.AUTO-MCNC: NEGATIVE MG/DL
LEUKOCYTE ESTERASE UR QL STRIP.AUTO: NEGATIVE
MICRO URNS: NORMAL
NITRITE UR QL STRIP.AUTO: NEGATIVE
PH UR STRIP.AUTO: 7.5 [PH]
PROT UR QL STRIP: NEGATIVE MG/DL
RBC UR QL AUTO: NEGATIVE
SIGNIFICANT IND 70042: ABNORMAL
SITE SITE: ABNORMAL
SOURCE SOURCE: ABNORMAL
SP GR UR STRIP.AUTO: 1.01
UROBILINOGEN UR STRIP.AUTO-MCNC: 0.2 MG/DL

## 2017-11-26 PROCEDURE — A9270 NON-COVERED ITEM OR SERVICE: HCPCS | Performed by: NURSE PRACTITIONER

## 2017-11-26 PROCEDURE — 81003 URINALYSIS AUTO W/O SCOPE: CPT

## 2017-11-26 PROCEDURE — 770006 HCHG ROOM/CARE - MED/SURG/GYN SEMI*

## 2017-11-26 PROCEDURE — 700111 HCHG RX REV CODE 636 W/ 250 OVERRIDE (IP): Performed by: NURSE PRACTITIONER

## 2017-11-26 PROCEDURE — 700102 HCHG RX REV CODE 250 W/ 637 OVERRIDE(OP): Performed by: NURSE PRACTITIONER

## 2017-11-26 RX ADMIN — MORPHINE SULFATE 2 MG: 4 INJECTION INTRAVENOUS at 15:47

## 2017-11-26 RX ADMIN — MORPHINE SULFATE 2 MG: 4 INJECTION INTRAVENOUS at 09:56

## 2017-11-26 RX ADMIN — MORPHINE SULFATE 2 MG: 4 INJECTION INTRAVENOUS at 05:48

## 2017-11-26 RX ADMIN — MORPHINE SULFATE 2 MG: 4 INJECTION INTRAVENOUS at 02:59

## 2017-11-26 RX ADMIN — CEFTRIAXONE 2 G: 2 INJECTION, POWDER, FOR SOLUTION INTRAMUSCULAR; INTRAVENOUS at 13:03

## 2017-11-26 RX ADMIN — OXYCODONE HYDROCHLORIDE 5 MG: 5 TABLET ORAL at 21:08

## 2017-11-26 RX ADMIN — MORPHINE SULFATE 4 MG: 4 INJECTION INTRAVENOUS at 12:19

## 2017-11-26 RX ADMIN — MORPHINE SULFATE 2 MG: 4 INJECTION INTRAVENOUS at 19:32

## 2017-11-26 RX ADMIN — MORPHINE SULFATE 2 MG: 4 INJECTION INTRAVENOUS at 00:08

## 2017-11-26 ASSESSMENT — PAIN SCALES - GENERAL
PAINLEVEL_OUTOF10: 1
PAINLEVEL_OUTOF10: 3
PAINLEVEL_OUTOF10: 7
PAINLEVEL_OUTOF10: 8
PAINLEVEL_OUTOF10: 7
PAINLEVEL_OUTOF10: 9
PAINLEVEL_OUTOF10: 4
PAINLEVEL_OUTOF10: 5
PAINLEVEL_OUTOF10: 6
PAINLEVEL_OUTOF10: 3
PAINLEVEL_OUTOF10: 8
PAINLEVEL_OUTOF10: 7
PAINLEVEL_OUTOF10: 7

## 2017-11-26 ASSESSMENT — PATIENT HEALTH QUESTIONNAIRE - PHQ9
SUM OF ALL RESPONSES TO PHQ9 QUESTIONS 1 AND 2: 0
SUM OF ALL RESPONSES TO PHQ QUESTIONS 1-9: 0
1. LITTLE INTEREST OR PLEASURE IN DOING THINGS: NOT AT ALL

## 2017-11-26 NOTE — CARE PLAN
Problem: Pain Management  Goal: Pain level will decrease to patient's comfort goal  Outcome: PROGRESSING SLOWER THAN EXPECTED  Staff will encourage pt to walk to relive gas pain instead of giving narcotics

## 2017-11-26 NOTE — PROGRESS NOTES
0645 Received report, introduced self to pt, reviewed labs, orders, allergies, code status    1226 urinalysis sent to lab and ordered full liquid diet. Pt is encouraged to walk but is not willing at the moment. This RN explained to pt that this is the best way to get the first gas passing post op and to relieve pain from gas buildup. Pt verbalizes understanding.     1410 spoke with lab as to why urinalysis has not resulted. Realized that POC ua was ordered, not lab. The POC is now Dc and lab UA is ordered and authenticated.

## 2017-11-26 NOTE — CARE PLAN
Problem: Bowel/Gastric:  Goal: Normal bowel function is maintained or improved  Outcome: PROGRESSING SLOWER THAN EXPECTED  Pt will ambulate to potentiate first gas passing

## 2017-11-26 NOTE — PROGRESS NOTES
Surgery     Feeling much better   Reports dysuria   Abdomen soft   Tolerating clear liquids     Advance diet   Hopefully home tomorrow   Check UA    Pascual Smith MD

## 2017-11-26 NOTE — CARE PLAN
Problem: Communication  Goal: The ability to communicate needs accurately and effectively will improve  Outcome: PROGRESSING AS EXPECTED  Q 2 hour rounding in place.      Problem: Pain Management  Goal: Pain level will decrease to patient's comfort goal  Outcome: PROGRESSING SLOWER THAN EXPECTED  Consistent pain scale used. Pain poorly controled with oral pain meds.

## 2017-11-26 NOTE — PROGRESS NOTES
"/77   Pulse (!) 102   Temp 37.6 °C (99.6 °F)   Resp 18   Ht 1.626 m (5' 4\")   Wt 60.4 kg (133 lb 2.5 oz)   SpO2 97%   Breastfeeding? No   BMI 22.86 kg/m²  Assumed care of patient from day RN. A&Ox 4. SBA assist. On room air. Moves all extremities, Denies numbness or tingling. Hypoactive BS, - flatus, LBM PTA. Clear liquid diet,- nausea small emessis see I&O. Wound(s): Right surgical incision, gauze and transparent, CDI; lap-stab x3, gauze and transparent, CDI. Abdomen is soft and tender. Patient reports 5 out of 10 pain, medicated per MAR. Pt is wearing treaded slipper socks, IV pole is on the same side as the bathroom, lower bedrails are down. Pt calls appropriatly. Discussed POC, all questions answered at this time. Bed is locked and in the lowest position, call light within reach, Q 2 hour rounding in place.    Pt boyfriend is spending the night, was approved by charge RN.  "

## 2017-11-27 LAB
ANION GAP SERPL CALC-SCNC: 9 MMOL/L (ref 0–11.9)
BASOPHILS # BLD AUTO: 0.9 % (ref 0–1.8)
BASOPHILS # BLD: 0.13 K/UL (ref 0–0.12)
BUN SERPL-MCNC: 6 MG/DL (ref 8–22)
CALCIUM SERPL-MCNC: 8.9 MG/DL (ref 8.5–10.5)
CHLORIDE SERPL-SCNC: 100 MMOL/L (ref 96–112)
CO2 SERPL-SCNC: 23 MMOL/L (ref 20–33)
CREAT SERPL-MCNC: 0.46 MG/DL (ref 0.5–1.4)
EOSINOPHIL # BLD AUTO: 0 K/UL (ref 0–0.51)
EOSINOPHIL NFR BLD: 0 % (ref 0–6.9)
ERYTHROCYTE [DISTWIDTH] IN BLOOD BY AUTOMATED COUNT: 41.2 FL (ref 35.9–50)
GFR SERPL CREATININE-BSD FRML MDRD: >60 ML/MIN/1.73 M 2
GLUCOSE SERPL-MCNC: 98 MG/DL (ref 65–99)
HCT VFR BLD AUTO: 38.5 % (ref 37–47)
HGB BLD-MCNC: 13.2 G/DL (ref 12–16)
LYMPHOCYTES # BLD AUTO: 0.61 K/UL (ref 1–4.8)
LYMPHOCYTES NFR BLD: 4.3 % (ref 22–41)
MANUAL DIFF BLD: NORMAL
MCH RBC QN AUTO: 30.4 PG (ref 27–33)
MCHC RBC AUTO-ENTMCNC: 34.3 G/DL (ref 33.6–35)
MCV RBC AUTO: 88.7 FL (ref 81.4–97.8)
MONOCYTES # BLD AUTO: 0.99 K/UL (ref 0–0.85)
MONOCYTES NFR BLD AUTO: 7 % (ref 0–13.4)
MORPHOLOGY BLD-IMP: NORMAL
NEUTROPHILS # BLD AUTO: 12.38 K/UL (ref 2–7.15)
NEUTROPHILS NFR BLD: 87.8 % (ref 44–72)
NRBC # BLD AUTO: 0 K/UL
NRBC BLD AUTO-RTO: 0 /100 WBC
PLATELET # BLD AUTO: 223 K/UL (ref 164–446)
PLATELET BLD QL SMEAR: NORMAL
PMV BLD AUTO: 11.7 FL (ref 9–12.9)
POTASSIUM SERPL-SCNC: 3.2 MMOL/L (ref 3.6–5.5)
RBC # BLD AUTO: 4.34 M/UL (ref 4.2–5.4)
RBC BLD AUTO: NORMAL
SODIUM SERPL-SCNC: 132 MMOL/L (ref 135–145)
WBC # BLD AUTO: 14.1 K/UL (ref 4.8–10.8)

## 2017-11-27 PROCEDURE — 80048 BASIC METABOLIC PNL TOTAL CA: CPT

## 2017-11-27 PROCEDURE — 700111 HCHG RX REV CODE 636 W/ 250 OVERRIDE (IP): Performed by: NURSE PRACTITIONER

## 2017-11-27 PROCEDURE — 36415 COLL VENOUS BLD VENIPUNCTURE: CPT

## 2017-11-27 PROCEDURE — 700101 HCHG RX REV CODE 250: Performed by: SURGERY

## 2017-11-27 PROCEDURE — 85007 BL SMEAR W/DIFF WBC COUNT: CPT

## 2017-11-27 PROCEDURE — A9270 NON-COVERED ITEM OR SERVICE: HCPCS | Performed by: NURSE PRACTITIONER

## 2017-11-27 PROCEDURE — 85027 COMPLETE CBC AUTOMATED: CPT

## 2017-11-27 PROCEDURE — 770006 HCHG ROOM/CARE - MED/SURG/GYN SEMI*

## 2017-11-27 PROCEDURE — 700102 HCHG RX REV CODE 250 W/ 637 OVERRIDE(OP): Performed by: NURSE PRACTITIONER

## 2017-11-27 RX ORDER — DEXTROSE MONOHYDRATE, SODIUM CHLORIDE, AND POTASSIUM CHLORIDE 50; 1.49; 9 G/1000ML; G/1000ML; G/1000ML
INJECTION, SOLUTION INTRAVENOUS CONTINUOUS
Status: DISCONTINUED | OUTPATIENT
Start: 2017-11-27 | End: 2017-11-30 | Stop reason: HOSPADM

## 2017-11-27 RX ADMIN — ACETAMINOPHEN 1000 MG: 500 TABLET ORAL at 11:55

## 2017-11-27 RX ADMIN — ONDANSETRON 4 MG: 2 INJECTION INTRAMUSCULAR; INTRAVENOUS at 09:15

## 2017-11-27 RX ADMIN — OXYCODONE HYDROCHLORIDE 5 MG: 5 TABLET ORAL at 02:54

## 2017-11-27 RX ADMIN — CEFTRIAXONE 2 G: 2 INJECTION, POWDER, FOR SOLUTION INTRAMUSCULAR; INTRAVENOUS at 11:55

## 2017-11-27 RX ADMIN — DIPHENHYDRAMINE HYDROCHLORIDE 25 MG: 50 INJECTION, SOLUTION INTRAMUSCULAR; INTRAVENOUS at 19:35

## 2017-11-27 RX ADMIN — OXYCODONE HYDROCHLORIDE 5 MG: 5 TABLET ORAL at 08:36

## 2017-11-27 RX ADMIN — MORPHINE SULFATE 4 MG: 4 INJECTION INTRAVENOUS at 10:32

## 2017-11-27 RX ADMIN — MORPHINE SULFATE 4 MG: 4 INJECTION INTRAVENOUS at 21:22

## 2017-11-27 RX ADMIN — POTASSIUM CHLORIDE, DEXTROSE MONOHYDRATE AND SODIUM CHLORIDE: 150; 5; 900 INJECTION, SOLUTION INTRAVENOUS at 11:55

## 2017-11-27 RX ADMIN — POTASSIUM CHLORIDE, DEXTROSE MONOHYDRATE AND SODIUM CHLORIDE: 150; 5; 900 INJECTION, SOLUTION INTRAVENOUS at 19:35

## 2017-11-27 RX ADMIN — DIPHENHYDRAMINE HYDROCHLORIDE 25 MG: 50 INJECTION, SOLUTION INTRAMUSCULAR; INTRAVENOUS at 12:17

## 2017-11-27 ASSESSMENT — PAIN SCALES - GENERAL
PAINLEVEL_OUTOF10: 6
PAINLEVEL_OUTOF10: 7
PAINLEVEL_OUTOF10: 7
PAINLEVEL_OUTOF10: ASSUMED PAIN PRESENT
PAINLEVEL_OUTOF10: 6

## 2017-11-27 NOTE — CARE PLAN
Problem: Communication  Goal: The ability to communicate needs accurately and effectively will improve  Outcome: PROGRESSING AS EXPECTED  Discussed POC at start of shift.     Problem: Pain Management  Goal: Pain level will decrease to patient's comfort goal  Outcome: PROGRESSING AS EXPECTED  PT has been able to transition from IV pain medication to oral Pain medication this shift.

## 2017-11-27 NOTE — PROGRESS NOTES
"/82   Pulse 100   Temp 36.2 °C (97.1 °F)   Resp 18   Ht 1.626 m (5' 4\")   Wt 60.4 kg (133 lb 2.5 oz)   SpO2 95%   Breastfeeding? No   BMI 22.86 kg/m² Assumed care of patient from day RN. A&Ox 4. SBA assist. On room air. Moves all extremities, Denies numbness or tingling. Hyperactive BS, denies flatus, LBM PTA. Abdomen is distended and tender. Full liquid diet,denies nausea or vomiting.  Wound(s): Right surgical incision, gauze and transparent, CDI; lap-stab x3, gauze and transparent, CDI. Abdomen is distended and tender. Patient reports 8 out of 10 pain, medicated per MAR. Pt is wearing treaded slipper socks, lower bedrails are down. Pt calls appropriatly. Discussed POC, all questions answered at this time. Bed is locked and in the lowest position, call light within reach, Q 2 hour rounding in place.     Pt boyfriend is spending the night, was approved by charge RN.  "

## 2017-11-27 NOTE — PROGRESS NOTES
Assumed care at 0700. Received report from night shift RN. Bedside report completed. AOx4.    C/o pain-medicated.  Medicated for nausea as needed. +voiding. -BM -flatus, encouraged ambulation   Ambulating with standby assist. Pt call light and belongings within reach, fall precautions in place.

## 2017-11-27 NOTE — PROGRESS NOTES
Surgery    Awake and alert  Reports nausea and vomited this morning.  Denies flatus  Abdomen soft slightly distended  Mild generalized tenderness  Incision bandage dry    Status post sacrectomy for severe appendicitis  Postoperative ileus  Check CBC and BMP today  Back off diet to sips of clears  Ambulate  Follow closely

## 2017-11-28 PROCEDURE — 770006 HCHG ROOM/CARE - MED/SURG/GYN SEMI*

## 2017-11-28 PROCEDURE — 700111 HCHG RX REV CODE 636 W/ 250 OVERRIDE (IP): Performed by: NURSE PRACTITIONER

## 2017-11-28 PROCEDURE — 700102 HCHG RX REV CODE 250 W/ 637 OVERRIDE(OP): Performed by: NURSE PRACTITIONER

## 2017-11-28 PROCEDURE — 700101 HCHG RX REV CODE 250: Performed by: SURGERY

## 2017-11-28 PROCEDURE — A9270 NON-COVERED ITEM OR SERVICE: HCPCS | Performed by: NURSE PRACTITIONER

## 2017-11-28 RX ADMIN — POTASSIUM CHLORIDE, DEXTROSE MONOHYDRATE AND SODIUM CHLORIDE: 150; 5; 900 INJECTION, SOLUTION INTRAVENOUS at 18:57

## 2017-11-28 RX ADMIN — POTASSIUM CHLORIDE, DEXTROSE MONOHYDRATE AND SODIUM CHLORIDE: 150; 5; 900 INJECTION, SOLUTION INTRAVENOUS at 01:53

## 2017-11-28 RX ADMIN — ACETAMINOPHEN 500 MG: 500 TABLET ORAL at 12:06

## 2017-11-28 RX ADMIN — POTASSIUM CHLORIDE, DEXTROSE MONOHYDRATE AND SODIUM CHLORIDE: 150; 5; 900 INJECTION, SOLUTION INTRAVENOUS at 09:50

## 2017-11-28 RX ADMIN — OXYCODONE HYDROCHLORIDE 5 MG: 5 TABLET ORAL at 22:50

## 2017-11-28 RX ADMIN — DIPHENHYDRAMINE HYDROCHLORIDE 25 MG: 50 INJECTION, SOLUTION INTRAMUSCULAR; INTRAVENOUS at 01:41

## 2017-11-28 RX ADMIN — OXYCODONE HYDROCHLORIDE 5 MG: 5 TABLET ORAL at 18:57

## 2017-11-28 RX ADMIN — CEFTRIAXONE 2 G: 2 INJECTION, POWDER, FOR SOLUTION INTRAMUSCULAR; INTRAVENOUS at 11:36

## 2017-11-28 RX ADMIN — OXYCODONE HYDROCHLORIDE 5 MG: 5 TABLET ORAL at 14:44

## 2017-11-28 ASSESSMENT — PAIN SCALES - GENERAL
PAINLEVEL_OUTOF10: 7
PAINLEVEL_OUTOF10: 6
PAINLEVEL_OUTOF10: 3
PAINLEVEL_OUTOF10: 4
PAINLEVEL_OUTOF10: 6

## 2017-11-28 ASSESSMENT — PATIENT HEALTH QUESTIONNAIRE - PHQ9
2. FEELING DOWN, DEPRESSED, IRRITABLE, OR HOPELESS: NOT AT ALL
SUM OF ALL RESPONSES TO PHQ9 QUESTIONS 1 AND 2: 0
1. LITTLE INTEREST OR PLEASURE IN DOING THINGS: NOT AT ALL
SUM OF ALL RESPONSES TO PHQ QUESTIONS 1-9: 0

## 2017-11-28 ASSESSMENT — COPD QUESTIONNAIRES
HAVE YOU SMOKED AT LEAST 100 CIGARETTES IN YOUR ENTIRE LIFE: NO/DON'T KNOW
DO YOU EVER COUGH UP ANY MUCUS OR PHLEGM?: NO/ONLY WITH OCCASIONAL COLDS OR INFECTIONS
DURING THE PAST 4 WEEKS HOW MUCH DID YOU FEEL SHORT OF BREATH: NONE/LITTLE OF THE TIME

## 2017-11-28 ASSESSMENT — LIFESTYLE VARIABLES: DO YOU DRINK ALCOHOL: NO

## 2017-11-28 NOTE — PROGRESS NOTES
VSS  A&O x 4  Nausea present. Medicated per MAR  Denies pain at this time.  Abdomen distended. + flatus.  3 lap stabs dermabond and Incision CDI  Sips of clears.   POC discussed  No further needs at this time.

## 2017-11-28 NOTE — PROGRESS NOTES
Bed alarm not in use despite pt education pt  Continues to refuse. Call light within reach. Pt verbalizes understanding.

## 2017-11-28 NOTE — CARE PLAN
Problem: Safety  Goal: Will remain free from injury  Outcome: PROGRESSING AS EXPECTED  Call light within reach, Bed locked in lowest position,.    Problem: Pain Management  Goal: Pain level will decrease to patient's comfort goal  Outcome: PROGRESSING AS EXPECTED  Pain controlled at this time. Pillows for extra support

## 2017-11-28 NOTE — PROGRESS NOTES
Assumed care at 0700. Received report from night shift RN. Bedside report completed. AOx4.    Denies pain.  Denies nausea.  Tolerating clears. +voiding. +watery BM's.  IVF infusing. Ambulating with standby assist.   Pt call light and belongings within reach, fall precautions in place. Family at bedside.

## 2017-11-28 NOTE — PROGRESS NOTES
Awake alert  Feeling better  Nausea improved   Had BM yesterday   Abdomen soft   Bandage clean     Clear liquids

## 2017-11-29 PROCEDURE — 700101 HCHG RX REV CODE 250: Performed by: SURGERY

## 2017-11-29 PROCEDURE — 770006 HCHG ROOM/CARE - MED/SURG/GYN SEMI*

## 2017-11-29 PROCEDURE — 700111 HCHG RX REV CODE 636 W/ 250 OVERRIDE (IP): Performed by: NURSE PRACTITIONER

## 2017-11-29 PROCEDURE — 700102 HCHG RX REV CODE 250 W/ 637 OVERRIDE(OP): Performed by: NURSE PRACTITIONER

## 2017-11-29 PROCEDURE — A9270 NON-COVERED ITEM OR SERVICE: HCPCS | Performed by: NURSE PRACTITIONER

## 2017-11-29 RX ADMIN — MORPHINE SULFATE 4 MG: 4 INJECTION INTRAVENOUS at 01:02

## 2017-11-29 RX ADMIN — POTASSIUM CHLORIDE, DEXTROSE MONOHYDRATE AND SODIUM CHLORIDE: 150; 5; 900 INJECTION, SOLUTION INTRAVENOUS at 11:26

## 2017-11-29 RX ADMIN — OXYCODONE HYDROCHLORIDE 5 MG: 5 TABLET ORAL at 20:10

## 2017-11-29 RX ADMIN — ACETAMINOPHEN 500 MG: 500 TABLET ORAL at 17:34

## 2017-11-29 RX ADMIN — OXYCODONE HYDROCHLORIDE 5 MG: 5 TABLET ORAL at 11:28

## 2017-11-29 RX ADMIN — CEFTRIAXONE 2 G: 2 INJECTION, POWDER, FOR SOLUTION INTRAMUSCULAR; INTRAVENOUS at 11:26

## 2017-11-29 RX ADMIN — OXYCODONE HYDROCHLORIDE 5 MG: 5 TABLET ORAL at 07:02

## 2017-11-29 RX ADMIN — ONDANSETRON 4 MG: 2 INJECTION INTRAMUSCULAR; INTRAVENOUS at 15:25

## 2017-11-29 RX ADMIN — POTASSIUM CHLORIDE, DEXTROSE MONOHYDRATE AND SODIUM CHLORIDE: 150; 5; 900 INJECTION, SOLUTION INTRAVENOUS at 20:16

## 2017-11-29 RX ADMIN — POTASSIUM CHLORIDE, DEXTROSE MONOHYDRATE AND SODIUM CHLORIDE: 150; 5; 900 INJECTION, SOLUTION INTRAVENOUS at 03:02

## 2017-11-29 RX ADMIN — ACETAMINOPHEN 500 MG: 500 TABLET ORAL at 11:28

## 2017-11-29 ASSESSMENT — PAIN SCALES - GENERAL
PAINLEVEL_OUTOF10: 6
PAINLEVEL_OUTOF10: 4
PAINLEVEL_OUTOF10: 3
PAINLEVEL_OUTOF10: 5
PAINLEVEL_OUTOF10: 7
PAINLEVEL_OUTOF10: 6

## 2017-11-29 ASSESSMENT — LIFESTYLE VARIABLES: DO YOU DRINK ALCOHOL: NO

## 2017-11-29 NOTE — PROGRESS NOTES
Pt. Calm and cooperative, resting in bed with family at the bedside. Pt got up to bathroom, had loose watery stool. Pt. Ambulated on unit with family. No needs at this time. Will continue to monitor.

## 2017-11-29 NOTE — PROGRESS NOTES
VSS  A&O x 4  No nausea at this time.   Denies pain at this time. + flatus.  3 lap stabs dermabond and Incision CDI  Sips of clears.   POC discussed  No further needs at this time.

## 2017-11-29 NOTE — PROGRESS NOTES
Surgery     Awake alert  Still reports feeling weak   Tolerating clears   Abdomen soft  Incisions OK     Advance diet   Ambulate   Hopefully home soon     Pascual Smith MD

## 2017-11-30 VITALS
TEMPERATURE: 97.2 F | DIASTOLIC BLOOD PRESSURE: 55 MMHG | HEIGHT: 64 IN | SYSTOLIC BLOOD PRESSURE: 98 MMHG | HEART RATE: 67 BPM | RESPIRATION RATE: 17 BRPM | OXYGEN SATURATION: 97 % | WEIGHT: 133.16 LBS | BODY MASS INDEX: 22.73 KG/M2

## 2017-11-30 PROCEDURE — 700101 HCHG RX REV CODE 250: Performed by: SURGERY

## 2017-11-30 PROCEDURE — 700102 HCHG RX REV CODE 250 W/ 637 OVERRIDE(OP): Performed by: NURSE PRACTITIONER

## 2017-11-30 PROCEDURE — A9270 NON-COVERED ITEM OR SERVICE: HCPCS | Performed by: NURSE PRACTITIONER

## 2017-11-30 RX ADMIN — OXYCODONE HYDROCHLORIDE 5 MG: 5 TABLET ORAL at 06:16

## 2017-11-30 RX ADMIN — ACETAMINOPHEN 500 MG: 500 TABLET ORAL at 00:00

## 2017-11-30 RX ADMIN — OXYCODONE HYDROCHLORIDE 5 MG: 5 TABLET ORAL at 00:49

## 2017-11-30 RX ADMIN — POTASSIUM CHLORIDE, DEXTROSE MONOHYDRATE AND SODIUM CHLORIDE: 150; 5; 900 INJECTION, SOLUTION INTRAVENOUS at 06:16

## 2017-11-30 RX ADMIN — OXYCODONE HYDROCHLORIDE 5 MG: 5 TABLET ORAL at 09:59

## 2017-11-30 ASSESSMENT — PAIN SCALES - GENERAL
PAINLEVEL_OUTOF10: 6
PAINLEVEL_OUTOF10: 4

## 2017-11-30 NOTE — DISCHARGE INSTRUCTIONS
YOB: 1990   Age: 27 y.o.               Admit Date: 11/24/2017     Discharge Date: 11/30/2017  Attending Doctor:  Pascual Smith M.D.                  Allergies:  Patient has no known allergies.  Medical History (as on file):   History reviewed. No pertinent past medical history.  Past Surgical History:   Procedure Laterality Date   • APPENDECTOMY LAPAROSCOPIC  11/24/2017    Procedure: ATTEMPTED LAPAROSCOPIC APPENDECTOMY CONVERTED TO OPEN CECECTOMY;  Surgeon: Pascual Smith M.D.;  Location: SURGERY [facility];  Service: General       Discharge Instructions  Blood Pressure: (!) 98/55  Weight: 60.4 kg (133 lb 2.5 oz)    Discharged to home by car with relative. Discharged via wheelchair, hospital escort: Yes.    Special equipment needed: Not Applicable    Belongings with: Personal    Instructions:    Follow up with Dr. Smith 1 week call for appt  [phone]     Be sure to schedule a follow-up appointment with your primary care doctor or any specialists as instructed.     Discharge Plan:   Diet Plan: Discussed  Activity Level: Discussed  Confirmed Follow up Appointment: Patient to Call and Schedule Appointment  Confirmed Symptoms Management: Discussed  Medication Reconciliation Updated: Yes  Influenza Vaccine Indication: Patient Refuses    DIET:  You may eat any foods that you can tolerate.  It is a good idea to eat a high fiber diet and take in plenty of fluids to prevent constipation.  If you do become constipated you may want to take a mild laxative or take ducolax tablets on a daily basis until your bowel habits are regular.  Constipation can be very uncomfortable, along with straining, after recent surgery.    I understand that a diet low in cholesterol, fat, and sodium is recommended for good health. Unless I have been given specific instructions below for another diet, I accept this instruction as my diet prescription.       Discharge Medication Instructions:    Below are the medications your  physician expects you to take upon discharge:    Review all your home medications and newly ordered medications with your doctor and/or pharmacist. Follow medication instructions as directed by your doctor and/or pharmacist.  GENERAL POST-OPERATIVE  PATIENT INSTRUCTIONS      FOLLOW-UP:  Please call your physician/ return to ER if you have any fevers greater than 100.4, drainage from your wound that is not clear or looks infected, persistent bleeding, increasing abdominal pain, problems urinating, or persistent nausea/vomiting.      WOUND CARE INSTRUCTIONS:  Keep a dry clean dressing on the wound if there is drainage. The initial bandage may be removed after 24 hours.  Once the wound has quit draining you may leave it open to air.  If clothing rubs against the wound or causes irritation and the wound is not draining you may cover it with a dry dressing during the daytime.  Try to keep the wound dry and avoid ointments on the wound unless directed to do so.  If the wound becomes bright red and painful or starts to drain infected material that is not clear, please contact your physician immediately.  If the wound is mildly pink and has a thick firm ridge underneath it, this is normal, and is referred to as a healing ridge.  This will resolve over the next 4-6 weeks. No baths, hot tubs, swimming, no submerging incisions, unit til healed.     ACTIVITY:  You are encouraged to cough and deep breath or use your incentive spirometer if you were given one, every 15-30 minutes when awake.  This will help prevent respiratory complications and low grade fevers post-operatively if you had a general anesthetic.  You may want to hug a pillow when coughing and sneezing to add additional support to the surgical area, if you had abdominal or chest surgery, which will decrease pain during these times.  You are encouraged to walk and engage in light activity for the next two weeks.  You should not lift more than 10 pounds during this  time frame as it could put you at increased risk for complications.      MEDICATIONS:  Try to take narcotic medications and anti-inflammatory medications, such as tylenol, ibuprofen, naprosyn, etc., with food.  This will minimize stomach upset from the medication.  Should you develop nausea and vomiting from the pain medication, or develop a rash, please discontinue the medication and contact your physician.  You should not drive, make important decisions, or operate machinery when taking narcotic pain medication.    QUESTIONS:  Please feel free to call your physician or the hospital  if you have any questions, and they will be glad to assist you.       Discharge Instructions      Special Instructions: None    · Is patient discharged on Warfarin / Coumadin?   No     · Is patient Post Blood Transfusion?  No    Depression / Suicide Risk    As you are discharged from this [facility] facility, it is important to learn how to keep safe from harming yourself.    Recognize the warning signs:  · Abrupt changes in personality, positive or negative- including increase in energy   · Giving away possessions  · Change in eating patterns- significant weight changes-  positive or negative  · Change in sleeping patterns- unable to sleep or sleeping all the time   · Unwillingness or inability to communicate  · Depression  · Unusual sadness, discouragement and loneliness  · Talk of wanting to die  · Neglect of personal appearance   · Rebelliousness- reckless behavior  · Withdrawal from people/activities they love  · Confusion- inability to concentrate     If you or a loved one observes any of these behaviors or has concerns about self-harm, here's what you can do:  · Talk about it- your feelings and reasons for harming yourself  · Remove any means that you might use to hurt yourself (examples: pills, rope, extension cords, firearm)  · Get professional help from the community (Mental Health, Substance Abuse, psychological  counseling)  · Do not be alone:Call your Safe Contact- someone whom you trust who will be there for you.  · Call your local CRISIS HOTLINE 077-3615 or 452-656-5065  · Call your local Children's Mobile Crisis Response Team [place] (043) 626-6359 or www.[employer]  · Call the toll free National Suicide Prevention Hotlines   · National Suicide Prevention Lifeline 150-612-OKKN (9783)  · National Hope Line Network 800-SUICIDE (706-9844)

## 2017-11-30 NOTE — DISCHARGE SUMMARY
CHIEF COMPLAINT ON ADMISSION  Chief Complaint   Patient presents with   • RLQ Pain     x4 days, denies any vaginal bleeding   • Pregnancy     LMP August 23       CODE STATUS  Full Code    HPI & HOSPITAL COURSE  This is a 27 y.o. female here with Severe appendicitis and pregnant      Therefore, she is discharged in good and stable condition with close outpatient follow-up.    SPECIFIC OUTPATIENT FOLLOW-UP  One week    DISCHARGE PROBLEM LIST  Active Problems:    Appendicitis with abscess POA: Unknown  Resolved Problems:    * No resolved hospital problems. *      FOLLOW UP  No future appointments.  Pascual Smith M.D.  [address] [place] [address] [id number]  [id number]  [facility] [id number]  [phone]    In 1 week        MEDICATIONS ON DISCHARGE  There are no discharge medications for this patient.       DIET  Orders Placed This Encounter   Procedures   • DIET ORDER     Standing Status:   Standing     Number of Occurrences:   1     Order Specific Question:   Diet:     Answer:   Regular [1]       ACTIVITY  As tolerated.  Weight bearing as tolerated      CONSULTATIONS  none    PROCEDURES  Ileocecetomy     LABORATORY  Lab Results   Component Value Date/Time    SODIUM 132 (L) 11/27/2017 10:44 AM    POTASSIUM 3.2 (L) 11/27/2017 10:44 AM    CHLORIDE 100 11/27/2017 10:44 AM    CO2 23 11/27/2017 10:44 AM    GLUCOSE 98 11/27/2017 10:44 AM    BUN 6 (L) 11/27/2017 10:44 AM    CREATININE 0.46 (L) 11/27/2017 10:44 AM        Lab Results   Component Value Date/Time    WBC 14.1 (H) 11/27/2017 10:44 AM    HEMOGLOBIN 13.2 11/27/2017 10:44 AM    HEMATOCRIT 38.5 11/27/2017 10:44 AM    PLATELETCT 223 11/27/2017 10:44 AM        Total time of the discharge process exceeds 31 minutes

## 2017-11-30 NOTE — PROGRESS NOTES
Discharging Patient home per physician order.  Discharged with family.  Demonstrated understanding of discharge instructions, follow up appointments, home medications, prescriptions, home care for surgical wound, and nursing care instructions.  Ambulating without assistance, voiding without difficulty, pain well controlled, tolerating oral medications, oxygen saturation greater than 90% , tolerating diet.   Educational handouts given and discussed.  Verbalized understanding of discharge instructions and educational handouts.  All questions answered.  Belongings with patient at time of discharge. PIV removed, ID band removed.

## 2017-11-30 NOTE — PROGRESS NOTES
Assumed care of pt at 1900, report given.   Pt A+O x 4, VSS, RA, and sitting up in bed.  Reports mild pain, medicated per MAR with scheduled Tylenol and PRN Oxy 5mg; tolerating well.  Pt has 3 midline abdominal lap sites and a RQ surgical incision; all covered with dry gauze and tegaderm, CDI.  Pt is 13-14 weeks pregnant and reports morning sickness nausea, but can tolerate a diet.  Ambulates with a standby assist; tolerates well.   Pt updated on POC and all questions answered at this time.  Mother at bedside active with care, bed in lowest position, call light within reach, and no other needs at this time.

## 2017-11-30 NOTE — PROGRESS NOTES
Assumed care of pt. At 0700. Pt. A&O x4., calm and cooperative. Pt. reports pain 6/10, pain med given. Pt. Denies nausea at this time. +flatus. No needs at this time. Pt. Resting in bed, call light within reach. Will continue to monitor.

## 2017-11-30 NOTE — CARE PLAN
Problem: Safety  Goal: Will remain free from injury  Outcome: PROGRESSING AS EXPECTED  Proper fall precautions in place. Call light within reach and encouraged to use. Hourly rounding in practice.      Problem: Infection  Goal: Will remain free from infection  Outcome: PROGRESSING AS EXPECTED  Pt educated on the s/s of infection, verbalizes understanding, will continue to monitor.

## 2017-12-10 NOTE — ADDENDUM NOTE
Encounter addended by: Priscilla Kaufman R.N. on: 12/10/2017 11:46 AM<BR>    Actions taken: Flowsheet accepted

## 2017-12-26 ENCOUNTER — HOSPITAL ENCOUNTER (OUTPATIENT)
Dept: LAB | Facility: MEDICAL CENTER | Age: 27
End: 2017-12-26
Attending: NURSE PRACTITIONER
Payer: COMMERCIAL

## 2017-12-26 ENCOUNTER — HOSPITAL ENCOUNTER (OUTPATIENT)
Facility: MEDICAL CENTER | Age: 27
End: 2017-12-26
Attending: NURSE PRACTITIONER
Payer: COMMERCIAL

## 2017-12-26 ENCOUNTER — APPOINTMENT (OUTPATIENT)
Dept: OBGYN | Facility: CLINIC | Age: 27
End: 2017-12-26
Payer: COMMERCIAL

## 2017-12-26 ENCOUNTER — INITIAL PRENATAL (OUTPATIENT)
Dept: OBGYN | Facility: CLINIC | Age: 27
End: 2017-12-26
Payer: COMMERCIAL

## 2017-12-26 VITALS
SYSTOLIC BLOOD PRESSURE: 112 MMHG | WEIGHT: 131 LBS | HEIGHT: 64 IN | DIASTOLIC BLOOD PRESSURE: 72 MMHG | BODY MASS INDEX: 22.36 KG/M2

## 2017-12-26 DIAGNOSIS — K35.33 APPENDICITIS WITH ABSCESS: ICD-10-CM

## 2017-12-26 DIAGNOSIS — B37.31 YEAST INFECTION INVOLVING THE VAGINA AND SURROUNDING AREA: ICD-10-CM

## 2017-12-26 DIAGNOSIS — Z34.02 SUPERVISION OF NORMAL FIRST PREGNANCY IN SECOND TRIMESTER: ICD-10-CM

## 2017-12-26 LAB
ABO GROUP BLD: NORMAL
APPEARANCE UR: ABNORMAL
APPEARANCE UR: NORMAL
BACTERIA #/AREA URNS HPF: ABNORMAL /HPF
BASOPHILS # BLD AUTO: 0.6 % (ref 0–1.8)
BASOPHILS # BLD: 0.07 K/UL (ref 0–0.12)
BILIRUB UR QL STRIP.AUTO: NEGATIVE
BILIRUB UR STRIP-MCNC: NORMAL MG/DL
BLD GP AB SCN SERPL QL: NORMAL
COLOR UR AUTO: NORMAL
COLOR UR: YELLOW
CULTURE IF INDICATED INDCX: YES UA CULTURE
EOSINOPHIL # BLD AUTO: 0.07 K/UL (ref 0–0.51)
EOSINOPHIL NFR BLD: 0.6 % (ref 0–6.9)
EPI CELLS #/AREA URNS HPF: ABNORMAL /HPF
ERYTHROCYTE [DISTWIDTH] IN BLOOD BY AUTOMATED COUNT: 46.5 FL (ref 35.9–50)
GLUCOSE UR STRIP.AUTO-MCNC: NEGATIVE MG/DL
GLUCOSE UR STRIP.AUTO-MCNC: NEGATIVE MG/DL
HBV SURFACE AG SER QL: NEGATIVE
HCT VFR BLD AUTO: 36.1 % (ref 37–47)
HGB BLD-MCNC: 12.1 G/DL (ref 12–16)
HIV 1+2 AB+HIV1 P24 AG SERPL QL IA: NON REACTIVE
IMM GRANULOCYTES # BLD AUTO: 0.1 K/UL (ref 0–0.11)
IMM GRANULOCYTES NFR BLD AUTO: 0.8 % (ref 0–0.9)
KETONES UR STRIP.AUTO-MCNC: 40 MG/DL
KETONES UR STRIP.AUTO-MCNC: NEGATIVE MG/DL
LEUKOCYTE ESTERASE UR QL STRIP.AUTO: ABNORMAL
LEUKOCYTE ESTERASE UR QL STRIP.AUTO: NORMAL
LYMPHOCYTES # BLD AUTO: 1.14 K/UL (ref 1–4.8)
LYMPHOCYTES NFR BLD: 9.4 % (ref 22–41)
MCH RBC QN AUTO: 30.9 PG (ref 27–33)
MCHC RBC AUTO-ENTMCNC: 33.5 G/DL (ref 33.6–35)
MCV RBC AUTO: 92.3 FL (ref 81.4–97.8)
MICRO URNS: ABNORMAL
MONOCYTES # BLD AUTO: 0.65 K/UL (ref 0–0.85)
MONOCYTES NFR BLD AUTO: 5.4 % (ref 0–13.4)
NEUTROPHILS # BLD AUTO: 10.04 K/UL (ref 2–7.15)
NEUTROPHILS NFR BLD: 83.2 % (ref 44–72)
NITRITE UR QL STRIP.AUTO: NEGATIVE
NITRITE UR QL STRIP.AUTO: NEGATIVE
NRBC # BLD AUTO: 0 K/UL
NRBC BLD-RTO: 0 /100 WBC
PH UR STRIP.AUTO: 7 [PH]
PH UR STRIP.AUTO: 7 [PH] (ref 5–8)
PLATELET # BLD AUTO: 181 K/UL (ref 164–446)
PMV BLD AUTO: 13.1 FL (ref 9–12.9)
PROT UR QL STRIP: NEGATIVE MG/DL
PROT UR QL STRIP: NEGATIVE MG/DL
RBC # BLD AUTO: 3.91 M/UL (ref 4.2–5.4)
RBC # URNS HPF: ABNORMAL /HPF
RBC UR QL AUTO: ABNORMAL
RBC UR QL AUTO: NEGATIVE
RH BLD: NORMAL
RUBV AB SER QL: 47.4 IU/ML
SP GR UR STRIP.AUTO: 1
SP GR UR STRIP.AUTO: 1.01
TREPONEMA PALLIDUM IGG+IGM AB [PRESENCE] IN SERUM OR PLASMA BY IMMUNOASSAY: NON REACTIVE
UROBILINOGEN UR STRIP-MCNC: NORMAL MG/DL
UROBILINOGEN UR STRIP.AUTO-MCNC: 0.2 MG/DL
WBC # BLD AUTO: 12.1 K/UL (ref 4.8–10.8)
WBC #/AREA URNS HPF: ABNORMAL /HPF
YEAST BUDDING URNS QL: PRESENT /HPF

## 2017-12-26 PROCEDURE — 86762 RUBELLA ANTIBODY: CPT

## 2017-12-26 PROCEDURE — 59401 PR NEW OB VISIT: CPT | Performed by: NURSE PRACTITIONER

## 2017-12-26 PROCEDURE — 87086 URINE CULTURE/COLONY COUNT: CPT

## 2017-12-26 PROCEDURE — 86900 BLOOD TYPING SEROLOGIC ABO: CPT

## 2017-12-26 PROCEDURE — 86780 TREPONEMA PALLIDUM: CPT

## 2017-12-26 PROCEDURE — 87389 HIV-1 AG W/HIV-1&-2 AB AG IA: CPT

## 2017-12-26 PROCEDURE — 86850 RBC ANTIBODY SCREEN: CPT

## 2017-12-26 PROCEDURE — 88175 CYTOPATH C/V AUTO FLUID REDO: CPT

## 2017-12-26 PROCEDURE — 81511 FTL CGEN ABNOR FOUR ANAL: CPT

## 2017-12-26 PROCEDURE — 85025 COMPLETE CBC W/AUTO DIFF WBC: CPT

## 2017-12-26 PROCEDURE — 81001 URINALYSIS AUTO W/SCOPE: CPT

## 2017-12-26 PROCEDURE — 86901 BLOOD TYPING SEROLOGIC RH(D): CPT

## 2017-12-26 PROCEDURE — 81002 URINALYSIS NONAUTO W/O SCOPE: CPT | Performed by: NURSE PRACTITIONER

## 2017-12-26 PROCEDURE — 87340 HEPATITIS B SURFACE AG IA: CPT

## 2017-12-26 PROCEDURE — 36415 COLL VENOUS BLD VENIPUNCTURE: CPT

## 2017-12-26 PROCEDURE — 87591 N.GONORRHOEAE DNA AMP PROB: CPT

## 2017-12-26 PROCEDURE — 87491 CHLMYD TRACH DNA AMP PROBE: CPT

## 2017-12-26 NOTE — PROGRESS NOTES
Cc: New OB visit    HPI:  The patient is a 27 y.o.  18w1d based upon LMP  Patient's last menstrual period was 2017. KAMLESH per LMP is 18, KAMLESH per U/S at 14 weeks and 2 days is 18  She presents for her new obstetric visit.  She Reports  fetal movement,  denies  vaginal bleeding,  denies  leakage of fluid,  denies contractions.   She Denies nausea/vomiting, denies headache, denies dysuria, Reports vaginal discharge.  Father of baby involved:no, family support: yes      Maternal 2nd cousin with Rett syndrome  Rare genetic   Offered counseling pt declines and declines CF testing       OB History    Para Term  AB Living   1             SAB TAB Ectopic Molar Multiple Live Births                    # Outcome Date GA Lbr Iftikhar/2nd Weight Sex Delivery Anes PTL Lv   1 Current                 Past Medical History:   Diagnosis Date   • Allergy    • Urinary tract infection 2017     Past Surgical History:   Procedure Laterality Date   • APPENDECTOMY LAPAROSCOPIC  2017    Procedure: ATTEMPTED LAPAROSCOPIC APPENDECTOMY CONVERTED TO OPEN CECECTOMY;  Surgeon: Pascual Smith M.D.;  Location: SURGERY [facility];  Service: General   • APPENDECTOMY       Social History     Social History   • Marital status: Single     Spouse name: N/A   • Number of children: N/A   • Years of education: N/A     Occupational History   • Not on file.     Social History Main Topics   • Smoking status: Never Smoker   • Smokeless tobacco: Never Used   • Alcohol use No   • Drug use: No   • Sexual activity: Yes     Partners: Male     Birth control/ protection: Pill     Other Topics Concern   • Not on file     Social History Narrative   • No narrative on file     Family History   Problem Relation Age of Onset   • Hypertension Mother    • Diabetes Father    • Hypertension Father    • Heart Disease Father    • Cancer Maternal Grandmother      ovarian cancer   • Cancer Maternal Grandfather    • No Known Problems Paternal  "Grandmother    • Psychiatry Paternal Grandfather    • Heart Disease Paternal Grandfather    • Hypertension Paternal Grandfather    • Hypertension Brother      Allergies:   Allergies as of 2017 - Reviewed 2017   Allergen Reaction Noted   • Hydrocodone Itching 2017       PE:    Blood pressure 112/72, height 1.626 m (5' 4\"), weight 59.4 kg (131 lb), last menstrual period 2017.    SVE: closed  Wet prep prepared and ready by me   + yeast only     see prenatal physical    LABS: Pending: PNP, Urine culture, GC/CT, Pap, AFP     A/P:  27 y.o.  18w1d based upon  Patient's last menstrual period was 2017..  She is here for her new obstetric visit.    1. Appendicitis with abscess     2. Supervision of normal first pregnancy in second trimester  PREG CNTR PRENATAL PN    US-OB 2ND 3RD TRI COMPLETE    AFP TETRA    THINPREP RFLX HPV ASCUS W/CTNG    POCT Urinalysis   3. Yeast infection involving the vagina and surrounding area  miconazole (MONISTAT 7 SIMPLY CURE) 2 % Cream       1. Ultrasound for dates and anatomy to be scheduled in 2 weeks.  2.  2nd trimester screening for Down Syndrome and neural tube defects offered. Patient accepts   3.  Do prenatal labs.  4.  NOB packet given with informational handouts.  5.  Increase water intake and encouraged healthy nutrition.  6.  Discussion of weight gain and proper exercise during pregnancy.  7.  Continue  prenatal vitamins.  8.  Follow-up in 4 weeks.   9. Discussion on centering and or individual care. Patient desires centering Anne to call patient   Declines Flu vaccine today   + yeast infection: Monitstat 7   "

## 2017-12-26 NOTE — PROGRESS NOTES
Pt. Here for NOB visit today.  Good FM  # [phone]  Pt states went to [facility] ER on 11/24/17 for abdominal sharp pains and had to have her appendix removed.   Had U/S done.   Pt. States no complaints.   Pharmacy verified.   Declines Flu vaccine

## 2017-12-26 NOTE — LETTER
Cystic Fibrosis Carrier Testing  Michelle Lima    The following information is about a blood test that can be done to determine if you and/or your partner carry the gene for cystic fibrosis.    WHAT IS CYSTIC FIBROSIS?  · Cystic fibrosis (CF) is an inherited disease that affects more than 25,000 American children and young adults.  · Symptoms of CF vary but include lung congestion, pneumonia, diarrhea and poor growth.  Most people with CF have severe medical problems and some die at a young age.  Others have so few symptoms they are unaware they have CF.  · CF does not affect intelligence.  · Although there is no cure for CF at this time, scientists are making progress in improving treatment and in searching for a cure.  In the past many people with CF  at a very young age.  Today, many are living into their 20’s and 30’s.    IS THERE A CHANCE MY BABY COULD HAVE CYSTIC FIBROSIS?  · You can have a child with CF even if there is no history in your family (see chart below).  · CF testing can help determine if you are a carrier and at risk to have a child with CF.  Note: if both parents are carriers, there is a 1 in 4 (25%) chance with each pregnancy that they will have a child with CF.  · Carriers have one normal CF gene and one altered CF gene.  · People with CF have two altered CF genes.  · Most people have two normal copies of the CF gene.    Approximate risk that a couple with no family history of cystic fibrosis will have a child with cystic fibrosis:    Ethnic background / Risk     couple:  1 in 2,500   couple:  1 in 15,000            couple:  1 in 8,000     American couple:  1 in 32,000     WHAT TESTING IS AVAILABLE?  · There is a blood test that can be done to find out if you or your partner is a carrier.  · It is important to understand that CF carrier testing does not detect all CF carriers.  · If the test shows that you are both CF carriers, you unborn baby  can be tested to find out if the baby has CF.    HOW MUCH DOES IT COST TO HAVE CYSTIC FIBROSIS CARRIER TESTING?  · Cost and insurance coverage for CF carrier testing vary depending upon the laboratory used and your insurance policy.  · The average cost for CF carrier testing is $300 per person.  · Your genetic counselor can provide you with more information about cystic fibrosis carrier testing.    _____  Yes, I am interested in discussing carrier testing with a genetic counselor.    _____  No, I am not interested in CF carrier testing or in receiving more information about CF carrier testing.      Client signature: ________________________________________  12/26/2017

## 2017-12-27 DIAGNOSIS — Z34.02 SUPERVISION OF NORMAL FIRST PREGNANCY IN SECOND TRIMESTER: ICD-10-CM

## 2017-12-28 LAB
# FETUSES US: NORMAL
AFP MOM SERPL: 1.23
AFP SERPL-MCNC: 59 NG/ML
AGE - REPORTED: 27.5 YR
BACTERIA UR CULT: ABNORMAL
BACTERIA UR CULT: ABNORMAL
GA METHOD: NORMAL
GA: 18.14 WEEKS
HCG MOM SERPL: 0.91
HCG SERPL-ACNC: NORMAL IU/L
IDDM PATIENT QL: NO
INHIBIN A MOM SERPL: 0.78
INHIBIN A SERPL-MCNC: 131 PG/ML
INTEGRATED SCN PATIENT-IMP: NORMAL
PATHOLOGY STUDY: NORMAL
SIGNIFICANT IND 70042: ABNORMAL
SITE SITE: ABNORMAL
SOURCE SOURCE: ABNORMAL
U ESTRIOL MOM SERPL: 0.91
U ESTRIOL SERPL-MCNC: 1.42 NG/ML

## 2017-12-29 LAB
C TRACH DNA GENITAL QL NAA+PROBE: NEGATIVE
CYTOLOGY REG CYTOL: NORMAL
N GONORRHOEA DNA GENITAL QL NAA+PROBE: NEGATIVE
SPECIMEN SOURCE: NORMAL

## 2018-01-26 ENCOUNTER — ROUTINE PRENATAL (OUTPATIENT)
Dept: OBGYN | Facility: CLINIC | Age: 28
End: 2018-01-26
Payer: COMMERCIAL

## 2018-01-26 VITALS — SYSTOLIC BLOOD PRESSURE: 92 MMHG | WEIGHT: 142 LBS | DIASTOLIC BLOOD PRESSURE: 60 MMHG | BODY MASS INDEX: 24.37 KG/M2

## 2018-01-26 DIAGNOSIS — Z34.02 SUPERVISION OF NORMAL FIRST PREGNANCY IN SECOND TRIMESTER: ICD-10-CM

## 2018-01-26 PROCEDURE — 90040 PR PRENATAL FOLLOW UP: CPT | Performed by: NURSE PRACTITIONER

## 2018-01-26 NOTE — PROGRESS NOTES
SUBJECTIVE:  Pt is a 27 y.o.   at 22w4d  gestation. Presents today for follow-up prenatal care. Reports no issues at this time.  Reports fetal movement. Denies cramping/contractions, bleeding or leaking of fluid. Denies dysuria, headaches, N/V, or other issues at this time. Generally feels well today.     OBJECTIVE:  - See prenatal vitals flow  Vitals:    18 1304   BP: (!) 92/60   Weight: 64.4 kg (142 lb)                 ASSESSMENT:   - IUP at 22w4d by LMP which is consistent with 14 week US   - S=D  Patient Active Problem List    Diagnosis Date Noted   • Supervision of normal first pregnancy in second trimester 2017   • Appendicitis with abscess 2017         PLAN:  - US scheduled 18  - S/sx pregnancy and labor warning signs vs general discomforts discussed  - Fetal movements and kick counts reviewed   - Adequate hydration reinforced  - Nutrition/exercise/vitamin education; continued PNV  - LnD tour info and classes provided   - Declines Flu vacc  - Anticipatory guidance given  - RTC in 4 weeks for follow-up prenatal care

## 2018-01-26 NOTE — PROGRESS NOTES
Pt here today for OB follow up  Pt states no complaints  Reports +  Good # [phone]  Pharmacy Confirmed.

## 2018-02-02 ENCOUNTER — APPOINTMENT (OUTPATIENT)
Dept: RADIOLOGY | Facility: IMAGING CENTER | Age: 28
End: 2018-02-02
Attending: NURSE PRACTITIONER
Payer: COMMERCIAL

## 2018-02-02 DIAGNOSIS — Z34.02 SUPERVISION OF NORMAL FIRST PREGNANCY IN SECOND TRIMESTER: ICD-10-CM

## 2018-02-02 PROCEDURE — 76805 OB US >/= 14 WKS SNGL FETUS: CPT | Performed by: OBSTETRICS & GYNECOLOGY

## 2018-02-05 ENCOUNTER — DATING (OUTPATIENT)
Dept: OBGYN | Facility: CLINIC | Age: 28
End: 2018-02-05

## 2018-03-02 ENCOUNTER — ROUTINE PRENATAL (OUTPATIENT)
Dept: OBGYN | Facility: CLINIC | Age: 28
End: 2018-03-02
Payer: COMMERCIAL

## 2018-03-02 VITALS — BODY MASS INDEX: 25.75 KG/M2 | SYSTOLIC BLOOD PRESSURE: 98 MMHG | DIASTOLIC BLOOD PRESSURE: 60 MMHG | WEIGHT: 150 LBS

## 2018-03-02 DIAGNOSIS — Z34.02 SUPERVISION OF NORMAL FIRST PREGNANCY IN SECOND TRIMESTER: ICD-10-CM

## 2018-03-02 PROCEDURE — 90040 PR PRENATAL FOLLOW UP: CPT | Performed by: NURSE PRACTITIONER

## 2018-03-02 PROCEDURE — 90715 TDAP VACCINE 7 YRS/> IM: CPT | Performed by: NURSE PRACTITIONER

## 2018-03-02 PROCEDURE — 90471 IMMUNIZATION ADMIN: CPT | Performed by: NURSE PRACTITIONER

## 2018-03-02 NOTE — LETTER
"Count Your Baby's Movements  Another step to a healthy delivery    Michelle Lima             Dept: [phone]    How Many Weeks Pregnant 27w4d    Date to Begin Counting: 3/2/18              How to use this chart    One way for your physician to keep track of your baby's health is by knowing how often the baby moves (or \"kicks\") in your womb.  You can help your physician to do this by using this chart every day.    Every day, you should see how many hours it takes for your baby to move 10 times.  Start in the morning, as soon as you get up.    · First, write down the time your baby moves until you get to 10.  · Check off one box every time your baby moves until you get to 10.  · Write down the time you finished counting in the last column.  · Total how long it took to count up all 10 movements.  · Finally, fill in the box that shows how long this took.  After counting 10 movements, you no longer have to count any more that day.  The next morning, just start counting again as soon as you get up.    What should you call a \"movement\"?  It is hard to say, because it will feel different from one mother to another and from one pregnancy to the next.  The important thing is that you count the movements the same way throughout your pregnancy.  If you have more questions, you should ask your physician.    Count carefully every day!  SAMPLE:  Week 28    How many hours did it take to feel 10 movements?       Start  Time     1     2     3     4     5     6     7     8     9     10   Finish Time   Mon 8:20 ·  ·  ·  ·  ·  ·  ·  ·  ·  ·  11:40   Tue Wed Thu Fri               Sat               Sun                 IMPORTANT: You should contact your physician if it takes more than two hours for you to feel 10 movements.  Each morning, write down the time and start to count the movements of your baby.  Keep track by checking off one box every time you feel one movement.  When you have felt " "10 \"kicks\", write down the time you finished counting in the last column.  Then fill in the   box (over the check kassidy) for the number of hours it took.  Be sure to read the complete instructions on the previous page.            "

## 2018-03-02 NOTE — PROGRESS NOTES
SUBJECTIVE:  Pt is a 27 y.o.   at 27w4d  gestation. Presents today for follow-up prenatal care. Reports no issues at this time.  Reports good fetal movement. Denies cramping/contractions, bleeding or leaking of fluid. Denies dysuria, headaches, N/V, or other issues at this time. Generally feels well today.     OBJECTIVE:  - See prenatal vitals flow  Vitals:    18 1437   BP: (!) 98/60   Weight: 68 kg (150 lb)                 ASSESSMENT:   - IUP at 27w4d by LMP which is consistent with 14 week US   - S=D  Patient Active Problem List    Diagnosis Date Noted   • Supervision of normal first pregnancy in second trimester 2017   • Appendicitis with abscess 2017         PLAN:  - FKC reviewed  - GCT ordered   - S/sx pregnancy and labor warning signs vs general discomforts discussed  - Fetal movements and kick counts reviewed   - Adequate hydration reinforced  - Nutrition/exercise/vitamin education; continued PNV  - S/p TDAP vacc today   - Anticipatory guidance given  - RTC in 3 weeks for follow-up prenatal care

## 2018-03-02 NOTE — PROGRESS NOTES
Tdap vaccine given. 03/02/2018 right  Deltoid. VIS given and screening check list reviewed with pt.

## 2018-03-02 NOTE — PROGRESS NOTES
Pt. Here for OB/F/U today Kick Count sheet given and explained to pt.   Good FM  Good # [phone]  Pt states having a hard time sleeping and slight cramping.   Pharmacy verified.   Pt given 1 HR GTT and RPR lab slip today along with instructions.   Tdap unsure.

## 2018-03-17 ENCOUNTER — HOSPITAL ENCOUNTER (OUTPATIENT)
Dept: LAB | Facility: MEDICAL CENTER | Age: 28
End: 2018-03-17
Attending: NURSE PRACTITIONER
Payer: COMMERCIAL

## 2018-03-17 DIAGNOSIS — Z34.02 SUPERVISION OF NORMAL FIRST PREGNANCY IN SECOND TRIMESTER: ICD-10-CM

## 2018-03-17 LAB
GLUCOSE 1H P 50 G GLC PO SERPL-MCNC: 104 MG/DL (ref 70–139)
HCT VFR BLD AUTO: 37.1 % (ref 37–47)
HGB BLD-MCNC: 12.1 G/DL (ref 12–16)
TREPONEMA PALLIDUM IGG+IGM AB [PRESENCE] IN SERUM OR PLASMA BY IMMUNOASSAY: NON REACTIVE

## 2018-03-17 PROCEDURE — 85018 HEMOGLOBIN: CPT

## 2018-03-17 PROCEDURE — 82950 GLUCOSE TEST: CPT

## 2018-03-17 PROCEDURE — 85014 HEMATOCRIT: CPT

## 2018-03-17 PROCEDURE — 36415 COLL VENOUS BLD VENIPUNCTURE: CPT

## 2018-03-17 PROCEDURE — 86780 TREPONEMA PALLIDUM: CPT

## 2018-03-23 ENCOUNTER — ROUTINE PRENATAL (OUTPATIENT)
Dept: OBGYN | Facility: CLINIC | Age: 28
End: 2018-03-23
Payer: COMMERCIAL

## 2018-03-23 VITALS — SYSTOLIC BLOOD PRESSURE: 100 MMHG | BODY MASS INDEX: 26.26 KG/M2 | WEIGHT: 153 LBS | DIASTOLIC BLOOD PRESSURE: 62 MMHG

## 2018-03-23 DIAGNOSIS — Z34.02 SUPERVISION OF NORMAL FIRST PREGNANCY IN SECOND TRIMESTER: ICD-10-CM

## 2018-03-23 PROCEDURE — 90040 PR PRENATAL FOLLOW UP: CPT | Performed by: NURSE PRACTITIONER

## 2018-03-23 ASSESSMENT — PATIENT HEALTH QUESTIONNAIRE - PHQ9: CLINICAL INTERPRETATION OF PHQ2 SCORE: 0

## 2018-03-23 NOTE — PROGRESS NOTES
Ob f/u. + fetal movement good  No VB, LOF or contractions   C/O sometimes pelvic pain and probably brax and crocker   Phone number # [phone]  Pharmacy verified with patient  WT=   153 lbs           DR=011/62

## 2018-03-23 NOTE — PROGRESS NOTES
SUBJECTIVE:  Pt is a 27 y.o.   at 30w4d  gestation. Presents today for follow-up prenatal care. Reports no issues at this time.  Reports good fetal movement. Denies cramping/contractions, bleeding or leaking of fluid. Denies dysuria, headaches, N/V, or other issues at this time. Generally feels well today. Reports sharp pains infrequently in vagina and lower abdomina mostly with movement.     OBJECTIVE:  - See prenatal vitals flow  Vitals:    18 1440   BP: 100/62   Weight: 69.4 kg (153 lb)                 ASSESSMENT:   - IUP at 30w4d by LMP which is consistent with 14 week US   - S=D  Patient Active Problem List    Diagnosis Date Noted   • Supervision of normal first pregnancy in second trimester 2017   • Appendicitis with abscess 2017         PLAN:  - S/sx pregnancy and labor warning signs vs general discomforts discussed  - Fetal movements and kick counts reviewed   - Adequate hydration reinforced  - Nutrition/exercise/vitamin education; continued PNV  - Anticipatory guidance given  - RTC in 2 weeks for follow-up prenatal care; still interested in [facility] and [facility] advised

## 2018-04-11 NOTE — PROGRESS NOTES
S:  Pt is  at 33w4d for Centering session #5.  Pt has no complaints, though has had incr pressure only.  Reports good FM.  Denies VB, LOF, RUCs or vaginal DC.    O:  Please see above vitals.        FHTs: 125        Fundal ht: 33        1hr      A:  IUP at 33w4d  Patient Active Problem List    Diagnosis Date Noted   • Supervision of normal first pregnancy in second trimester 2017   • Appendicitis with abscess 2017        P:  1.  Declines BTL.          2.  Questions answered.          3.  Encouraged adequate water intake        4.  F/u 2wks Centering Session #6        5.  Continue FKCs.    Centering Topics Reviewed:  1.  Labor  2.  Birth facility  3.  Breathing  4.  Medications for labor & birth  5.  Early labor -- when to call  6.  Kick counts  7.  Dental hygiene presentation/Dental care for you and baby

## 2018-04-13 ENCOUNTER — ROUTINE PRENATAL (OUTPATIENT)
Dept: OBGYN | Facility: CLINIC | Age: 28
End: 2018-04-13
Payer: COMMERCIAL

## 2018-04-13 VITALS — DIASTOLIC BLOOD PRESSURE: 69 MMHG | BODY MASS INDEX: 26.26 KG/M2 | WEIGHT: 153 LBS | SYSTOLIC BLOOD PRESSURE: 125 MMHG

## 2018-04-13 DIAGNOSIS — Z34.02 SUPERVISION OF NORMAL FIRST PREGNANCY IN SECOND TRIMESTER: ICD-10-CM

## 2018-04-13 PROCEDURE — 90040 PR PRENATAL FOLLOW UP: CPT | Performed by: PHYSICIAN ASSISTANT

## 2018-04-18 ENCOUNTER — TELEPHONE (OUTPATIENT)
Dept: OBGYN | Facility: CLINIC | Age: 28
End: 2018-04-18

## 2018-04-18 NOTE — TELEPHONE ENCOUNTER
Pt called triage line stating she believes she has a yeast infection wants to know if something can be called into her pharmacy or if she needs to come into the clinic to be seen. Called pt pt states has a white cottage cheese discharge that itches, informed pt that she could use Monistat 7 over the counter also advised her that the first treatment may burn a little due to the irritation of the yeast infection but to continue using and if symptoms do not get better within 3-4 days to call us back because we may need to fit her in to be seen. per consult with Liya REECE Pt verbalized understanding and will comply pt had no further questions or concerns. Pt to keep appt. On 4/27/18.

## 2018-04-24 NOTE — PROGRESS NOTES
S:  Pt is  at 35w4d for Centering session #6.  Pt has no complaints but has had vag itching.  Reports good FM.  Denies VB, LOF, RUCs or vaginal DC.    O:  Please see above vitals.        FHTs: 132        Fundal ht: 35        Wet mt: +Yeast          A:  IUP at 35w4d  Patient Active Problem List    Diagnosis Date Noted   • Supervision of normal first pregnancy in second trimester 2017   • Appendicitis with abscess 2017        P:  1.  Questions answered.          2.  Encouraged adequate water intake        3.  F/u 2wks Centering Session #7, 1 wk OB f/u        4.  Continue FKCs.          5.  GBS done today        6.  Diflucan rx called in today    Centering Topics Reviewed:  1.  The Birth Experience

## 2018-04-27 ENCOUNTER — HOSPITAL ENCOUNTER (OUTPATIENT)
Facility: MEDICAL CENTER | Age: 28
End: 2018-04-27
Attending: PHYSICIAN ASSISTANT
Payer: COMMERCIAL

## 2018-04-27 ENCOUNTER — ROUTINE PRENATAL (OUTPATIENT)
Dept: OBGYN | Facility: CLINIC | Age: 28
End: 2018-04-27
Payer: COMMERCIAL

## 2018-04-27 VITALS — BODY MASS INDEX: 26.43 KG/M2 | DIASTOLIC BLOOD PRESSURE: 67 MMHG | WEIGHT: 154 LBS | SYSTOLIC BLOOD PRESSURE: 116 MMHG

## 2018-04-27 DIAGNOSIS — Z34.02 SUPERVISION OF NORMAL FIRST PREGNANCY IN SECOND TRIMESTER: ICD-10-CM

## 2018-04-27 DIAGNOSIS — B37.31 YEAST INFECTION OF THE VAGINA: ICD-10-CM

## 2018-04-27 DIAGNOSIS — Z34.02 SUPERVISION OF NORMAL FIRST PREGNANCY IN SECOND TRIMESTER: Primary | ICD-10-CM

## 2018-04-27 PROCEDURE — 90040 PR PRENATAL FOLLOW UP: CPT | Performed by: PHYSICIAN ASSISTANT

## 2018-04-27 PROCEDURE — 87653 STREP B DNA AMP PROBE: CPT

## 2018-04-27 RX ORDER — FLUCONAZOLE 150 MG/1
150 TABLET ORAL DAILY
Qty: 2 TAB | Refills: 0 | Status: SHIPPED | OUTPATIENT
Start: 2018-04-27 | End: 2018-05-28

## 2018-04-29 LAB — GP B STREP DNA SPEC QL NAA+PROBE: NEGATIVE

## 2018-05-04 ENCOUNTER — ROUTINE PRENATAL (OUTPATIENT)
Dept: OBGYN | Facility: CLINIC | Age: 28
End: 2018-05-04
Payer: COMMERCIAL

## 2018-05-04 VITALS — SYSTOLIC BLOOD PRESSURE: 112 MMHG | BODY MASS INDEX: 26.95 KG/M2 | DIASTOLIC BLOOD PRESSURE: 56 MMHG | WEIGHT: 157 LBS

## 2018-05-04 DIAGNOSIS — Z34.00 SUPERVISION OF NORMAL FIRST PREGNANCY, ANTEPARTUM: ICD-10-CM

## 2018-05-04 PROCEDURE — 90040 PR PRENATAL FOLLOW UP: CPT | Performed by: NURSE PRACTITIONER

## 2018-05-04 NOTE — PROGRESS NOTES
SUBJECTIVE:  Pt is a 27 y.o.   at 36w4d  gestation. Presents today for follow-up prenatal care. Reports no issues at this time.  Reports good  fetal movement. Denies cramping/contractions, bleeding or leaking of fluid. Denies dysuria, headaches, N/V, or other issues at this time. Generally feels well today.     OBJECTIVE:  - See prenatal vitals flow  -   Vitals:    18 1443   BP: 112/56   Weight: 71.2 kg (157 lb)      Labs - normal prenatal labs  US -normal fetal survey            ASSESSMENT:   - IUP at 36w4d    - S=D   -   Patient Active Problem List    Diagnosis Date Noted   • Supervision of normal first pregnancy in second trimester 2017   • Appendicitis with abscess 2017         PLAN:  - S/sx pregnancy and labor warning signs vs general discomforts discussed  - Fetal movements and kick counts reviewed   - Adequate hydration reinforced  - Nutrition/exercise/vitamin education; continued PNV  -  Continue centering pregnancy

## 2018-05-04 NOTE — PROGRESS NOTES
Pt here today for OB follow up  Reports +FM  WT: 157 lb  BP: 112/56  Pt states no complaints today  Good # [phone]

## 2018-05-10 ENCOUNTER — ROUTINE PRENATAL (OUTPATIENT)
Dept: OBGYN | Facility: CLINIC | Age: 28
End: 2018-05-10
Payer: COMMERCIAL

## 2018-05-10 VITALS — BODY MASS INDEX: 26.78 KG/M2 | DIASTOLIC BLOOD PRESSURE: 62 MMHG | WEIGHT: 156 LBS | SYSTOLIC BLOOD PRESSURE: 112 MMHG

## 2018-05-10 DIAGNOSIS — Z34.03 ENCOUNTER FOR SUPERVISION OF NORMAL FIRST PREGNANCY IN THIRD TRIMESTER: ICD-10-CM

## 2018-05-10 PROBLEM — Z34.90 SUPERVISION OF NORMAL PREGNANCY: Status: ACTIVE | Noted: 2018-05-10

## 2018-05-10 PROCEDURE — 90040 PR PRENATAL FOLLOW UP: CPT | Performed by: OBSTETRICS & GYNECOLOGY

## 2018-05-10 NOTE — PROGRESS NOTES
Patient is at 37w3d .no complaints  Fetal Movement : positive       Patients' weight gain, fluid intake and exercise level discussed.Vitals, fundal height , fetal position, and FHR reviewed on flowsheet.    .../62   Wt 70.8 kg (156 lb)   LMP 08/21/2017   BMI 26.78 kg/m²   Past Medical History:   Diagnosis Date   • Allergy    • Urinary tract infection 12/2017     Patient Active Problem List    Diagnosis Date Noted   • Supervision of normal first pregnancy in second trimester 12/26/2017   • Appendicitis with abscess 11/25/2017         Lab:  Recent Results (from the past 336 hour(s))   GRP B STREP, BY PCR (BRIZUELA BROTH)    Collection Time: 04/27/18 11:33 AM   Result Value Ref Range    Strep Gp B DNA PCR Negative Negative       Assessment:  1  37w3d  2. . Doing well  3. Size equals Dates and/or Scan  4. Weight gain: normal: No, excessive:No                        Plan.  1. Rediscuss diet.  2. Increase water intake PRN  3. Continue vitamins.  4. Kick counts as instructed.  5. Discuss support hose and proper shoe wear as indicated.  6. Labor instruction

## 2018-05-10 NOTE — PROGRESS NOTES
Pt here today for OB follow up  Pt states having pressure and vaginal pain  Reports +  Good # [phone]  Pharmacy Confirmed.

## 2018-05-18 ENCOUNTER — ROUTINE PRENATAL (OUTPATIENT)
Dept: OBGYN | Facility: CLINIC | Age: 28
End: 2018-05-18
Payer: COMMERCIAL

## 2018-05-18 VITALS — DIASTOLIC BLOOD PRESSURE: 60 MMHG | BODY MASS INDEX: 26.95 KG/M2 | SYSTOLIC BLOOD PRESSURE: 110 MMHG | WEIGHT: 157 LBS

## 2018-05-18 DIAGNOSIS — Z34.03 ENCOUNTER FOR SUPERVISION OF NORMAL FIRST PREGNANCY IN THIRD TRIMESTER: Primary | ICD-10-CM

## 2018-05-18 PROCEDURE — 90040 PR PRENATAL FOLLOW UP: CPT | Performed by: NURSE PRACTITIONER

## 2018-05-18 NOTE — PROGRESS NOTES
Ob f/u. + fetal movement good  No VB, LOF or contractions   C/O random contractions   Phone number #   [phone]  Pharmacy verified with patient  [id number]             QG=[id number]

## 2018-05-18 NOTE — PROGRESS NOTES
S:  Pt is  at 38w4d here for routine OB follow up.  Reports an occ UC.  Reports good FM.  Denies VB, LOF, RUCs, or vaginal DC.     O:  Please see above vitals.        FHTs: 143        Fundal ht: 36        Fetal position: vertex        SVE: FT/60/-2/soft        GBS neg on 18 -- reviewed w pt.      A:  IUP at 38w4d  Patient Active Problem List    Diagnosis Date Noted   • Supervision of normal pregnancy 05/10/2018   • Supervision of normal first pregnancy in second trimester 2017   • Appendicitis with abscess 2017       P:  1.  Continue FKCs.         2.  Labor precautions given.  Instructions given on where to go.  Pt receptive to education.         3.  D/w pt IOL policy.  IOL referral placed.        4.  Questions answered.         5.  Encouraged adequate water intake       6.  F/u 1wk

## 2018-05-18 NOTE — PATIENT INSTRUCTIONS
P:  1.  Continue FKCs.         2.  Labor precautions given.  Instructions given on where to go.  Pt receptive to education.         3.  D/w pt IOL policy.  IOL referral placed.        4.  Questions answered.         5.  Encouraged adequate water intake       6.  F/u 1wk

## 2018-05-25 ENCOUNTER — ROUTINE PRENATAL (OUTPATIENT)
Dept: OBGYN | Facility: CLINIC | Age: 28
End: 2018-05-25
Payer: COMMERCIAL

## 2018-05-25 VITALS — BODY MASS INDEX: 26.95 KG/M2 | DIASTOLIC BLOOD PRESSURE: 66 MMHG | SYSTOLIC BLOOD PRESSURE: 104 MMHG | WEIGHT: 157 LBS

## 2018-05-25 DIAGNOSIS — Z34.03 ENCOUNTER FOR SUPERVISION OF NORMAL FIRST PREGNANCY IN THIRD TRIMESTER: ICD-10-CM

## 2018-05-25 PROCEDURE — 90040 PR PRENATAL FOLLOW UP: CPT | Performed by: PHYSICIAN ASSISTANT

## 2018-05-25 NOTE — PROGRESS NOTES
Pt has no complaints with cramping, UCs, Vb, LOF. Pt missed Centering appt today due to work. +FM. IOL scheduled 6/5 for gel at 9pm, then 6/6 for IOL at 9am. Pt given info today. Labor precautions reviewed. Daily FKC and walks recommended. Cervix: Ft/50/floating, vtx. RTC 1 wk or sooner prn.

## 2018-05-25 NOTE — PROGRESS NOTES
Pt here for OB f/u. Denies any complications today. Reports +FM.  Good phone# [phone]  Pharmacy verified with pt.

## 2018-05-28 ENCOUNTER — HOSPITAL ENCOUNTER (OUTPATIENT)
Facility: MEDICAL CENTER | Age: 28
End: 2018-05-28
Attending: OBSTETRICS & GYNECOLOGY | Admitting: OBSTETRICS & GYNECOLOGY
Payer: COMMERCIAL

## 2018-05-28 ENCOUNTER — HOSPITAL ENCOUNTER (EMERGENCY)
Facility: MEDICAL CENTER | Age: 28
End: 2018-05-28
Attending: EMERGENCY MEDICINE
Payer: COMMERCIAL

## 2018-05-28 VITALS
HEIGHT: 65 IN | RESPIRATION RATE: 16 BRPM | BODY MASS INDEX: 26.59 KG/M2 | OXYGEN SATURATION: 97 % | HEART RATE: 85 BPM | WEIGHT: 159.61 LBS | SYSTOLIC BLOOD PRESSURE: 122 MMHG | TEMPERATURE: 98.6 F | DIASTOLIC BLOOD PRESSURE: 71 MMHG

## 2018-05-28 VITALS — DIASTOLIC BLOOD PRESSURE: 63 MMHG | HEART RATE: 77 BPM | SYSTOLIC BLOOD PRESSURE: 108 MMHG | TEMPERATURE: 98.1 F

## 2018-05-28 DIAGNOSIS — O36.8130 DECREASED FETAL MOVEMENTS IN THIRD TRIMESTER, SINGLE OR UNSPECIFIED FETUS: ICD-10-CM

## 2018-05-28 PROCEDURE — 59025 FETAL NON-STRESS TEST: CPT | Performed by: NURSE PRACTITIONER

## 2018-05-28 PROCEDURE — 99284 EMERGENCY DEPT VISIT MOD MDM: CPT

## 2018-05-28 ASSESSMENT — PAIN SCALES - GENERAL
PAINLEVEL_OUTOF10: 0
PAINLEVEL_OUTOF10: 0

## 2018-05-28 NOTE — ED NOTES
Pt going pov to [facility], ed md called report, pt ambulated out of ed with steady gait with significant other and mother.

## 2018-05-28 NOTE — ED NOTES
"Pt is 9 months pregnant, due date today, came to hospital to \"have my baby\"; pt of Dr. Pastrana's, pt denies contractions, vaginal discharge or pain/pressure.  "

## 2018-05-28 NOTE — ED PROVIDER NOTES
ED Provider Note    ED Provider Note      Primary care provider: Pcp Pt States None    CHIEF COMPLAINT  Chief Complaint   Patient presents with   • Pregnancy       HPI  Michelle Lima is a 27 y.o. female who presents to the Emergency Department with chief complaint of decreased fetal activity.  Patient is a  at approximately 40 weeks.  Due date is today.  Patient reports that over the last 4-5 hours she has had decreased fetal activity decreased kick counts.  States it is very on like her baby.  She has had no pain.  She denies having contractions.  No loss of fluid and abnormal discharge or bleeding from the vagina she has had no urinary frequency hesitancy urgency no bowel complaints.  Patient was just mainly concerned that she was having decreased fetal activity.  Patient's obstetrician is stated this is Dr. Mata.  She is pretty registered at [facility] for delivery.    REVIEW OF SYSTEMS  Positive for decreased fetal movement negative for vaginal bleeding discharge loss of fluid urinary or bowel complaints    PAST MEDICAL HISTORY   has a past medical history of Allergy and Urinary tract infection (2017).    SURGICAL HISTORY  Open appendectomy/ileocecectomy    SOCIAL HISTORY  Social History   Substance Use Topics   • Smoking status: Never Smoker   • Smokeless tobacco: Never Used   • Alcohol use No      History   Drug Use No       FAMILY HISTORY  Non-Contributory    CURRENT MEDICATIONS  Home Medications     Reviewed by Soila Lua R.N. (Registered Nurse) on 18 at 0215  Med List Status: Partial   Medication Last Dose Status   Prenatal MV-Min-Fe Fum-FA-DHA (PRENATAL 1 PO) 2017 Active                ALLERGIES  Allergies   Allergen Reactions   • Hydrocodone Itching     Pt states was given for her appendix removal and started feeling itchy all over her body.        PHYSICAL EXAM  VITAL SIGNS: /72   Pulse 74   Temp 36.4 °C (97.6 °F)   Resp 17   Ht 1.651 m  "(5' 5\")   Wt 72.4 kg (159 lb 9.8 oz)   LMP 08/21/2017   SpO2 99%   BMI 26.56 kg/m²   Pulse ox interpretation: I interpret this pulse ox as normal.  Constitutional: Alert and oriented x 3, no acute distress    Cardiovascular: Regular rate and rhythm no murmur rub or gallop 2+ pulses peripherally x4  Thorax & Lungs: No respiratory distress, no wheezes rales or rhonchi, No chest tenderness.   GI: Gravid at term no tenderness present bowel sounds    Skin: Warm dry no acute rash or lesion    Neurologic: Cranial nerves III through XII are grossly intact, no sensory deficit, no cerebellar dysfunction   Psychiatric: Appropriate affect for situation at this time      DIAGNOSTIC STUDIES / PROCEDURES    RADIOLOGY      Bedside ultrasound shows fetal heart rate at approximately 140 minimal visualized movement in upper and lower extremities.      COURSE & MEDICAL DECISION MAKING  Pertinent Labs & Imaging studies reviewed. (See chart for details)    2:27 AM - Patient seen and examined at bedside.           Patient noted to have slightly elevated blood pressure likely circumstantial secondary to presenting complaint. Referred to primary care physician for further evaluation.      Medical Decision Making: Appropriate fetal heart rate otherwise limited examination due to availability of resources at this facility.  I discussed with patient that I would advise her going to the labor and delivery floor at the main campus for further evaluation and treatment possible tocolytic monitoring possible biophysical profile.  Even instructions keep close monitoring of fetal kick counts until she arrives there.  I alerted the labor and delivery floor to expect her arrival.  Otherwise patient is well-appearing stable she will go by private vehicle to the labor and delivery floor for near discharged in stable condition.    /72   Pulse 74   Temp 36.4 °C (97.6 °F)   Resp 17   Ht 1.651 m (5' 5\")   Wt 72.4 kg (159 lb 9.8 oz)   LMP " 08/21/2017   SpO2 99%   BMI 26.56 kg/m²     [facility] Labor and Delivery    Go to      [facility], Emergency Dept  [address]  [place] [id number] [phone]    If symptoms worsen        FINAL IMPRESSION  1. Decreased fetal movements in third trimester, single or unspecified fetus           This dictation has been created using voice recognition software and/or scribes. The accuracy of the dictation is limited by the abilities of the software and the expertise of the scribes. I expect there may be some errors of grammar and possibly content. I made every attempt to manually correct the errors within my dictation. However, errors related to voice recognition software and/or scribes may still exist and should be interpreted within the appropriate context.

## 2018-05-28 NOTE — PROGRESS NOTES
Summary of events:  Pt presented to L&D for c/o decreased fetal movement. Pt denies UC's, LOF, bright red vaginal bleeding. Pt is a  with an EDC of 18 making her 40 0/7. EFM and TOCO applied. Reactive tracing obtained. FLORIDA Reynolds notified of pt complaint, reactive tracing, and fetal movement felt by pt once monitoring began. Orders to discharge pt received. Labor precautions and fetal kick count instructions given. Pt and family verbalized an understanding. Pt discharged home with family.

## 2018-06-01 ENCOUNTER — ROUTINE PRENATAL (OUTPATIENT)
Dept: OBGYN | Facility: CLINIC | Age: 28
End: 2018-06-01
Payer: COMMERCIAL

## 2018-06-01 VITALS — BODY MASS INDEX: 26.13 KG/M2 | SYSTOLIC BLOOD PRESSURE: 110 MMHG | WEIGHT: 157 LBS | DIASTOLIC BLOOD PRESSURE: 64 MMHG

## 2018-06-01 DIAGNOSIS — Z34.03 ENCOUNTER FOR SUPERVISION OF NORMAL FIRST PREGNANCY IN THIRD TRIMESTER: ICD-10-CM

## 2018-06-01 PROCEDURE — 90040 PR PRENATAL FOLLOW UP: CPT | Performed by: PHYSICIAN ASSISTANT

## 2018-06-01 NOTE — PROGRESS NOTES
Pt has no complaints with cramping, strong UCs, Vb, LOF though pt had brownish d/c after working out yesteday. +FM. IOL scheduled for 6/5 gel 6/6 IOL, but pt possibly wanting to wait longer. L&D notified, but no openings at this time for IOL on 6/7-6/11. Pt to keep current appt, and call us next week to determine if she wants to wait more. Will try birthing call, pelvic tilts in meantime. Cervix: Ft/50/-3, post, soft, vtx. Daily FKC and walks recommended. RTC 1 wk or IOL on 6/6.

## 2018-06-01 NOTE — PROGRESS NOTES
OB f/u today  No VB, LOF or UC's.  Good phone # [phone]  Patient is scheduled for GEL on 06/05/18 at 9pm and IOL on 06/06/18 at 9am.   GBS negative  Pt seen at L&D on 5-28-18 for FM, now reports good FM

## 2018-06-02 ENCOUNTER — HOSPITAL ENCOUNTER (OUTPATIENT)
Facility: MEDICAL CENTER | Age: 28
End: 2018-06-02
Attending: OBSTETRICS & GYNECOLOGY | Admitting: OBSTETRICS & GYNECOLOGY
Payer: COMMERCIAL

## 2018-06-02 ENCOUNTER — HOSPITAL ENCOUNTER (INPATIENT)
Facility: MEDICAL CENTER | Age: 28
LOS: 2 days | End: 2018-06-04
Attending: OBSTETRICS & GYNECOLOGY | Admitting: OBSTETRICS & GYNECOLOGY
Payer: COMMERCIAL

## 2018-06-02 VITALS
BODY MASS INDEX: 26.63 KG/M2 | SYSTOLIC BLOOD PRESSURE: 119 MMHG | HEIGHT: 64 IN | DIASTOLIC BLOOD PRESSURE: 74 MMHG | WEIGHT: 156 LBS | HEART RATE: 64 BPM | TEMPERATURE: 97.1 F

## 2018-06-02 LAB
BASOPHILS # BLD AUTO: 0.5 % (ref 0–1.8)
BASOPHILS # BLD: 0.06 K/UL (ref 0–0.12)
CRYSTALS AMN MICRO: NORMAL
EOSINOPHIL # BLD AUTO: 0.05 K/UL (ref 0–0.51)
EOSINOPHIL NFR BLD: 0.4 % (ref 0–6.9)
ERYTHROCYTE [DISTWIDTH] IN BLOOD BY AUTOMATED COUNT: 48.6 FL (ref 35.9–50)
HCT VFR BLD AUTO: 42.5 % (ref 37–47)
HGB BLD-MCNC: 14.3 G/DL (ref 12–16)
HOLDING TUBE BB 8507: NORMAL
IMM GRANULOCYTES # BLD AUTO: 0.08 K/UL (ref 0–0.11)
IMM GRANULOCYTES NFR BLD AUTO: 0.6 % (ref 0–0.9)
LYMPHOCYTES # BLD AUTO: 1.65 K/UL (ref 1–4.8)
LYMPHOCYTES NFR BLD: 13.4 % (ref 22–41)
MCH RBC QN AUTO: 31 PG (ref 27–33)
MCHC RBC AUTO-ENTMCNC: 33.6 G/DL (ref 33.6–35)
MCV RBC AUTO: 92.2 FL (ref 81.4–97.8)
MONOCYTES # BLD AUTO: 0.79 K/UL (ref 0–0.85)
MONOCYTES NFR BLD AUTO: 6.4 % (ref 0–13.4)
NEUTROPHILS # BLD AUTO: 9.69 K/UL (ref 2–7.15)
NEUTROPHILS NFR BLD: 78.7 % (ref 44–72)
NRBC # BLD AUTO: 0 K/UL
NRBC BLD-RTO: 0 /100 WBC
PLATELET # BLD AUTO: 146 K/UL (ref 164–446)
PMV BLD AUTO: 13.5 FL (ref 9–12.9)
RBC # BLD AUTO: 4.61 M/UL (ref 4.2–5.4)
WBC # BLD AUTO: 12.3 K/UL (ref 4.8–10.8)

## 2018-06-02 PROCEDURE — 700111 HCHG RX REV CODE 636 W/ 250 OVERRIDE (IP): Performed by: NURSE PRACTITIONER

## 2018-06-02 PROCEDURE — 89060 EXAM SYNOVIAL FLUID CRYSTALS: CPT

## 2018-06-02 PROCEDURE — 85025 COMPLETE CBC W/AUTO DIFF WBC: CPT

## 2018-06-02 PROCEDURE — 36415 COLL VENOUS BLD VENIPUNCTURE: CPT

## 2018-06-02 PROCEDURE — 59025 FETAL NON-STRESS TEST: CPT | Performed by: NURSE PRACTITIONER

## 2018-06-02 PROCEDURE — 770002 HCHG ROOM/CARE - OB PRIVATE (112)

## 2018-06-02 PROCEDURE — 700105 HCHG RX REV CODE 258: Performed by: NURSE PRACTITIONER

## 2018-06-02 RX ORDER — MISOPROSTOL 200 UG/1
800 TABLET ORAL
Status: DISCONTINUED | OUTPATIENT
Start: 2018-06-02 | End: 2018-06-03 | Stop reason: HOSPADM

## 2018-06-02 RX ORDER — DEXTROSE, SODIUM CHLORIDE, SODIUM LACTATE, POTASSIUM CHLORIDE, AND CALCIUM CHLORIDE 5; .6; .31; .03; .02 G/100ML; G/100ML; G/100ML; G/100ML; G/100ML
INJECTION, SOLUTION INTRAVENOUS CONTINUOUS
Status: DISCONTINUED | OUTPATIENT
Start: 2018-06-03 | End: 2018-06-03 | Stop reason: HOSPADM

## 2018-06-02 RX ORDER — SODIUM CHLORIDE, SODIUM LACTATE, POTASSIUM CHLORIDE, CALCIUM CHLORIDE 600; 310; 30; 20 MG/100ML; MG/100ML; MG/100ML; MG/100ML
INJECTION, SOLUTION INTRAVENOUS CONTINUOUS
Status: DISPENSED | OUTPATIENT
Start: 2018-06-02 | End: 2018-06-03

## 2018-06-02 RX ORDER — TERBUTALINE SULFATE 1 MG/ML
0.25 INJECTION, SOLUTION SUBCUTANEOUS PRN
Status: DISCONTINUED | OUTPATIENT
Start: 2018-06-02 | End: 2018-06-03 | Stop reason: HOSPADM

## 2018-06-02 RX ORDER — METHYLERGONOVINE MALEATE 0.2 MG/ML
0.2 INJECTION INTRAVENOUS
Status: DISCONTINUED | OUTPATIENT
Start: 2018-06-02 | End: 2018-06-03 | Stop reason: HOSPADM

## 2018-06-02 RX ORDER — OXYTOCIN 10 [USP'U]/ML
10 INJECTION, SOLUTION INTRAMUSCULAR; INTRAVENOUS
Status: DISCONTINUED | OUTPATIENT
Start: 2018-06-02 | End: 2018-06-03 | Stop reason: HOSPADM

## 2018-06-02 RX ORDER — CARBOPROST TROMETHAMINE 250 UG/ML
250 INJECTION, SOLUTION INTRAMUSCULAR
Status: DISCONTINUED | OUTPATIENT
Start: 2018-06-02 | End: 2018-06-03 | Stop reason: HOSPADM

## 2018-06-02 RX ORDER — CITRIC ACID/SODIUM CITRATE 334-500MG
30 SOLUTION, ORAL ORAL EVERY 6 HOURS PRN
Status: DISCONTINUED | OUTPATIENT
Start: 2018-06-02 | End: 2018-06-03 | Stop reason: HOSPADM

## 2018-06-02 RX ORDER — ALUMINA, MAGNESIA, AND SIMETHICONE 2400; 2400; 240 MG/30ML; MG/30ML; MG/30ML
30 SUSPENSION ORAL EVERY 6 HOURS PRN
Status: DISCONTINUED | OUTPATIENT
Start: 2018-06-02 | End: 2018-06-03 | Stop reason: HOSPADM

## 2018-06-02 RX ADMIN — SODIUM CHLORIDE, POTASSIUM CHLORIDE, SODIUM LACTATE AND CALCIUM CHLORIDE: 600; 310; 30; 20 INJECTION, SOLUTION INTRAVENOUS at 20:16

## 2018-06-02 RX ADMIN — Medication 1 MILLI-UNITS/MIN: at 21:48

## 2018-06-02 RX ADMIN — SODIUM CHLORIDE, POTASSIUM CHLORIDE, SODIUM LACTATE AND CALCIUM CHLORIDE: 600; 310; 30; 20 INJECTION, SOLUTION INTRAVENOUS at 23:39

## 2018-06-02 ASSESSMENT — COPD QUESTIONNAIRES
DO YOU EVER COUGH UP ANY MUCUS OR PHLEGM?: NO/ONLY WITH OCCASIONAL COLDS OR INFECTIONS
IN THE PAST 12 MONTHS DO YOU DO LESS THAN YOU USED TO BECAUSE OF YOUR BREATHING PROBLEMS: DISAGREE/UNSURE
HAVE YOU SMOKED AT LEAST 100 CIGARETTES IN YOUR ENTIRE LIFE: NO/DON'T KNOW
COPD SCREENING SCORE: 0
DURING THE PAST 4 WEEKS HOW MUCH DID YOU FEEL SHORT OF BREATH: NONE/LITTLE OF THE TIME

## 2018-06-02 ASSESSMENT — LIFESTYLE VARIABLES
ALCOHOL_USE: NO
EVER_SMOKED: NEVER

## 2018-06-02 ASSESSMENT — PAIN SCALES - GENERAL: PAINLEVEL_OUTOF10: 4

## 2018-06-02 ASSESSMENT — PATIENT HEALTH QUESTIONNAIRE - PHQ9
2. FEELING DOWN, DEPRESSED, IRRITABLE, OR HOPELESS: NOT AT ALL
1. LITTLE INTEREST OR PLEASURE IN DOING THINGS: NOT AT ALL
SUM OF ALL RESPONSES TO PHQ9 QUESTIONS 1 AND 2: 0

## 2018-06-02 NOTE — PROGRESS NOTES
EDC  40w5d. Pt presents to L&D with complaints of UC's. Pt taken to triage for assessment.     0550 TOCO and EFM applied, VSS. Pt states she has been lacy on and off the last couple of days but that she lost her mucous plug yesterday. Pt states her contractions began getting more uncomfortable over the last several hours and more consistent at aprox 5 mins apart. Pt reports +FM, denies LOF but has had some spotting. SVE /-3. This RN will update ANGEL Vazquez on pt status.     606 ANGEL Vazquez updated on pt status, orders for discharge received.     0610 RN at bedside, labor precautions given and all questions answered.     0615 Pt discharged home in stable condition.

## 2018-06-03 LAB
BASOPHILS # BLD AUTO: 0.2 % (ref 0–1.8)
BASOPHILS # BLD: 0.04 K/UL (ref 0–0.12)
EOSINOPHIL # BLD AUTO: 0.01 K/UL (ref 0–0.51)
EOSINOPHIL NFR BLD: 0 % (ref 0–6.9)
ERYTHROCYTE [DISTWIDTH] IN BLOOD BY AUTOMATED COUNT: 46.2 FL (ref 35.9–50)
ERYTHROCYTE [DISTWIDTH] IN BLOOD BY AUTOMATED COUNT: 48.1 FL (ref 35.9–50)
HCT VFR BLD AUTO: 32.1 % (ref 37–47)
HCT VFR BLD AUTO: 33.9 % (ref 37–47)
HGB BLD-MCNC: 10.7 G/DL (ref 12–16)
HGB BLD-MCNC: 11.8 G/DL (ref 12–16)
IMM GRANULOCYTES # BLD AUTO: 0.19 K/UL (ref 0–0.11)
IMM GRANULOCYTES NFR BLD AUTO: 0.9 % (ref 0–0.9)
LYMPHOCYTES # BLD AUTO: 0.68 K/UL (ref 1–4.8)
LYMPHOCYTES NFR BLD: 3.2 % (ref 22–41)
MCH RBC QN AUTO: 30.8 PG (ref 27–33)
MCH RBC QN AUTO: 31.3 PG (ref 27–33)
MCHC RBC AUTO-ENTMCNC: 33.3 G/DL (ref 33.6–35)
MCHC RBC AUTO-ENTMCNC: 34.8 G/DL (ref 33.6–35)
MCV RBC AUTO: 89.9 FL (ref 81.4–97.8)
MCV RBC AUTO: 92.5 FL (ref 81.4–97.8)
MONOCYTES # BLD AUTO: 1.19 K/UL (ref 0–0.85)
MONOCYTES NFR BLD AUTO: 5.7 % (ref 0–13.4)
NEUTROPHILS # BLD AUTO: 18.94 K/UL (ref 2–7.15)
NEUTROPHILS NFR BLD: 90 % (ref 44–72)
NRBC # BLD AUTO: 0 K/UL
NRBC BLD-RTO: 0 /100 WBC
PLATELET # BLD AUTO: 116 K/UL (ref 164–446)
PLATELET # BLD AUTO: 128 K/UL (ref 164–446)
PMV BLD AUTO: 13.6 FL (ref 9–12.9)
RBC # BLD AUTO: 3.47 M/UL (ref 4.2–5.4)
RBC # BLD AUTO: 3.77 M/UL (ref 4.2–5.4)
WBC # BLD AUTO: 16.3 K/UL (ref 4.8–10.8)
WBC # BLD AUTO: 21.1 K/UL (ref 4.8–10.8)

## 2018-06-03 PROCEDURE — 304965 HCHG RECOVERY SERVICES

## 2018-06-03 PROCEDURE — 700111 HCHG RX REV CODE 636 W/ 250 OVERRIDE (IP)

## 2018-06-03 PROCEDURE — 85027 COMPLETE CBC AUTOMATED: CPT

## 2018-06-03 PROCEDURE — 700111 HCHG RX REV CODE 636 W/ 250 OVERRIDE (IP): Performed by: NURSE PRACTITIONER

## 2018-06-03 PROCEDURE — A9270 NON-COVERED ITEM OR SERVICE: HCPCS | Performed by: NURSE PRACTITIONER

## 2018-06-03 PROCEDURE — 36415 COLL VENOUS BLD VENIPUNCTURE: CPT

## 2018-06-03 PROCEDURE — 85025 COMPLETE CBC W/AUTO DIFF WBC: CPT

## 2018-06-03 PROCEDURE — 59409 OBSTETRICAL CARE: CPT

## 2018-06-03 PROCEDURE — 700112 HCHG RX REV CODE 229: Performed by: NURSE PRACTITIONER

## 2018-06-03 PROCEDURE — 770002 HCHG ROOM/CARE - OB PRIVATE (112)

## 2018-06-03 PROCEDURE — 700102 HCHG RX REV CODE 250 W/ 637 OVERRIDE(OP): Performed by: NURSE PRACTITIONER

## 2018-06-03 PROCEDURE — 0KQM0ZZ REPAIR PERINEUM MUSCLE, OPEN APPROACH: ICD-10-PCS | Performed by: OBSTETRICS & GYNECOLOGY

## 2018-06-03 RX ORDER — METOCLOPRAMIDE HYDROCHLORIDE 5 MG/ML
10 INJECTION INTRAMUSCULAR; INTRAVENOUS EVERY 6 HOURS PRN
Status: DISCONTINUED | OUTPATIENT
Start: 2018-06-03 | End: 2018-06-04 | Stop reason: HOSPADM

## 2018-06-03 RX ORDER — IBUPROFEN 800 MG/1
800 TABLET ORAL EVERY 8 HOURS PRN
Status: DISCONTINUED | OUTPATIENT
Start: 2018-06-03 | End: 2018-06-04 | Stop reason: HOSPADM

## 2018-06-03 RX ORDER — VITAMIN A ACETATE, BETA CAROTENE, ASCORBIC ACID, CHOLECALCIFEROL, .ALPHA.-TOCOPHEROL ACETATE, DL-, THIAMINE MONONITRATE, RIBOFLAVIN, NIACINAMIDE, PYRIDOXINE HYDROCHLORIDE, FOLIC ACID, CYANOCOBALAMIN, CALCIUM CARBONATE, FERROUS FUMARATE, ZINC OXIDE, CUPRIC OXIDE 3080; 12; 120; 400; 1; 1.84; 3; 20; 22; 920; 25; 200; 27; 10; 2 [IU]/1; UG/1; MG/1; [IU]/1; MG/1; MG/1; MG/1; MG/1; MG/1; [IU]/1; MG/1; MG/1; MG/1; MG/1; MG/1
1 TABLET, FILM COATED ORAL EVERY MORNING
Status: DISCONTINUED | OUTPATIENT
Start: 2018-06-03 | End: 2018-06-04 | Stop reason: HOSPADM

## 2018-06-03 RX ORDER — ACETAMINOPHEN 325 MG/1
325 TABLET ORAL EVERY 4 HOURS PRN
Status: DISCONTINUED | OUTPATIENT
Start: 2018-06-03 | End: 2018-06-04 | Stop reason: HOSPADM

## 2018-06-03 RX ORDER — METHYLERGONOVINE MALEATE 0.2 MG/ML
0.2 INJECTION INTRAVENOUS
Status: DISCONTINUED | OUTPATIENT
Start: 2018-06-03 | End: 2018-06-04 | Stop reason: HOSPADM

## 2018-06-03 RX ORDER — LIDOCAINE HYDROCHLORIDE 10 MG/ML
INJECTION, SOLUTION EPIDURAL; INFILTRATION; INTRACAUDAL; PERINEURAL
Status: COMPLETED
Start: 2018-06-03 | End: 2018-06-03

## 2018-06-03 RX ORDER — ONDANSETRON 4 MG/1
4 TABLET, ORALLY DISINTEGRATING ORAL EVERY 6 HOURS PRN
Status: DISCONTINUED | OUTPATIENT
Start: 2018-06-03 | End: 2018-06-04 | Stop reason: HOSPADM

## 2018-06-03 RX ORDER — ONDANSETRON 2 MG/ML
4 INJECTION INTRAMUSCULAR; INTRAVENOUS EVERY 6 HOURS PRN
Status: DISCONTINUED | OUTPATIENT
Start: 2018-06-03 | End: 2018-06-04 | Stop reason: HOSPADM

## 2018-06-03 RX ORDER — MISOPROSTOL 200 UG/1
600 TABLET ORAL
Status: DISCONTINUED | OUTPATIENT
Start: 2018-06-03 | End: 2018-06-04 | Stop reason: HOSPADM

## 2018-06-03 RX ORDER — DOCUSATE SODIUM 100 MG/1
100 CAPSULE, LIQUID FILLED ORAL 2 TIMES DAILY PRN
Status: DISCONTINUED | OUTPATIENT
Start: 2018-06-03 | End: 2018-06-04 | Stop reason: HOSPADM

## 2018-06-03 RX ORDER — BISACODYL 10 MG
10 SUPPOSITORY, RECTAL RECTAL PRN
Status: DISCONTINUED | OUTPATIENT
Start: 2018-06-03 | End: 2018-06-04 | Stop reason: HOSPADM

## 2018-06-03 RX ORDER — SODIUM CHLORIDE, SODIUM LACTATE, POTASSIUM CHLORIDE, CALCIUM CHLORIDE 600; 310; 30; 20 MG/100ML; MG/100ML; MG/100ML; MG/100ML
INJECTION, SOLUTION INTRAVENOUS PRN
Status: DISCONTINUED | OUTPATIENT
Start: 2018-06-03 | End: 2018-06-04 | Stop reason: HOSPADM

## 2018-06-03 RX ADMIN — DOCUSATE SODIUM 100 MG: 100 CAPSULE ORAL at 14:07

## 2018-06-03 RX ADMIN — Medication 2000 ML/HR: at 02:11

## 2018-06-03 RX ADMIN — ACETAMINOPHEN 325 MG: 325 TABLET, FILM COATED ORAL at 17:56

## 2018-06-03 RX ADMIN — ACETAMINOPHEN 325 MG: 325 TABLET, FILM COATED ORAL at 06:02

## 2018-06-03 RX ADMIN — IBUPROFEN 800 MG: 800 TABLET, FILM COATED ORAL at 22:18

## 2018-06-03 RX ADMIN — Medication 125 ML/HR: at 03:16

## 2018-06-03 RX ADMIN — LIDOCAINE HYDROCHLORIDE 30 ML: 10 INJECTION, SOLUTION EPIDURAL; INFILTRATION; INTRACAUDAL; PERINEURAL at 02:13

## 2018-06-03 RX ADMIN — Medication 1 TABLET: at 09:24

## 2018-06-03 RX ADMIN — IBUPROFEN 800 MG: 800 TABLET, FILM COATED ORAL at 03:15

## 2018-06-03 RX ADMIN — IBUPROFEN 800 MG: 800 TABLET, FILM COATED ORAL at 14:08

## 2018-06-03 RX ADMIN — ACETAMINOPHEN 325 MG: 325 TABLET, FILM COATED ORAL at 10:03

## 2018-06-03 ASSESSMENT — PAIN SCALES - GENERAL
PAINLEVEL_OUTOF10: 7
PAINLEVEL_OUTOF10: 4
PAINLEVEL_OUTOF10: 5
PAINLEVEL_OUTOF10: 7
PAINLEVEL_OUTOF10: 7
PAINLEVEL_OUTOF10: 3
PAINLEVEL_OUTOF10: 4

## 2018-06-03 ASSESSMENT — PATIENT HEALTH QUESTIONNAIRE - PHQ9
1. LITTLE INTEREST OR PLEASURE IN DOING THINGS: NOT AT ALL
2. FEELING DOWN, DEPRESSED, IRRITABLE, OR HOPELESS: NOT AT ALL
SUM OF ALL RESPONSES TO PHQ9 QUESTIONS 1 AND 2: 0

## 2018-06-03 NOTE — PROGRESS NOTES
1820  Pt into triage c/o leaking throughout the day where the pt soaked a few pads here and there.  Monitors placed at this time and POC discussed.  1830  SVE with fern obtained at this time.  Blood spotted on hand with removal.  No pooling noted, perineum wet though.  Awaiting results at this time.  1900  Dr. Tamayo notified of fern results and orders for admission received.  Report to PATRICE Orellana in room with pt at this time.

## 2018-06-03 NOTE — H&P
History and Physical      Michelle Lima is a 27 y.o. year old female  at 40w5d who presents for LOF @ 0900 - clear fluid.    Subjective:   positive  For CTXS.   positive Feels pain   positive for LOF  positive for vaginal bleeding - bloody show  positive for fetal movement    ROS: A comprehensive review of systems was negative.    Past Medical History:   Diagnosis Date   • Allergy    • Urinary tract infection 2017     Past Surgical History:   Procedure Laterality Date   • APPENDECTOMY LAPAROSCOPIC  2017    Procedure: ATTEMPTED LAPAROSCOPIC APPENDECTOMY CONVERTED TO OPEN CECECTOMY;  Surgeon: Pascual Smith M.D.;  Location: SURGERY [facility];  Service: General   • APPENDECTOMY       OB History    Para Term  AB Living   1             SAB TAB Ectopic Molar Multiple Live Births                    # Outcome Date GA Lbr Iftikhar/2nd Weight Sex Delivery Anes PTL Lv   1 Current                 Social History     Social History   • Marital status: Single     Spouse name: N/A   • Number of children: N/A   • Years of education: N/A     Occupational History   • Not on file.     Social History Main Topics   • Smoking status: Never Smoker   • Smokeless tobacco: Never Used   • Alcohol use No   • Drug use: No   • Sexual activity: Yes     Partners: Male     Birth control/ protection: Pill     Other Topics Concern   • Not on file     Social History Narrative   • No narrative on file     Allergies: Hydrocodone  No current facility-administered medications for this encounter.     Prenatal care with [facility] starting at 18wks with following problems:  Patient Active Problem List    Diagnosis Date Noted   • Supervision of normal pregnancy 05/10/2018   • Supervision of normal first pregnancy in second trimester 2017   • Appendicitis with abscess 2017         Objective:      Last menstrual period 2017.    General:   no acute distress, alert, cooperative   Skin:   normal   HEENT:  PERRLA    Lungs:   CTA bilateral   Heart:   S1, S2 normal, no murmur, click, rub or gallop, regular rate and rhythm, brisk carotid upstroke without bruits, peripheral pulses very brisk, chest is clear without rales or wheezing, no pedal edema, no JVD, no hepatosplenomegaly   Abdomen:   gravid, NT   EFW:  3200 g   Pelvis:  Exam deferred.   FHTs: 140 BPM + accels - decels mod variability   Contractions: 2 contractions in 10 min mild to palpation   Uterine Size: S=D   Presentations: Cephalic per RN   Cervix: Per RN    Dilation: 3-4    Effacement: 90    Station:  -2    Consistency: Medium    Position: Middle     Complete OB US  2/2/2018 2:19 PM    HISTORY/REASON FOR EXAM:  Evaluate fetal anatomy, alpha-fetoprotein within normal limits    TECHNIQUE/EXAM DESCRIPTION: OB complete ultrasound.    COMPARISON:  11/24/2017    FINDINGS:  Fetal Lie:  Transverse  LMP:  8/21/2017  Clinical KAMLESH by LMP:  5/28/2018    Placenta (Location):  Anterior  Placenta Previa: No  Placental Grade: I    Amniotic Fluid Volume:  EDILMA = 19.04 cm    Fetal Heart Rate:  146 bpm    Cervical Length:  3.89 cm transabdominal    No maternal adnexal mass is identified.    Umbilical Artery S/D Ratio(s):  Not applicable    Fetal Anatomy  (Seen or Not Seen)  Lateral Ventricles     Seen  Cisterna Magna        Seen  Cerebellum              Seen  CSP             Seen  Orbits             Seen  Face/Lips                Seen  Cord Insertion         Seen  Placental CI         Seen  4 Chamber Heart     Seen  LVOT               Seen  RVOT              Seen  Stomach       Seen  Kidneys                   Seen  Urinary Bladder      Seen  Spine                       Seen  3 Vessel Cord          Seen  Both Upper Extremities    Seen  Both Lower Extremities    Seen  Diaphragm             Seen  Movement       Seen  Gender:  Likely female    Fetal Biometry  BPD    5.74 cm, 23 weeks, 4 days  HC    21.06 cm, 23 weeks, 1 day  AC    18.68 cm, 23 weeks, 3 days  Femur Length    4.04 cm, 23  weeks  Humerus Length    3.58 cm, 22 weeks, 3 days  Cerebellum Diameter   2.64 cm    EGA by this US:  23 weeks, 1 day  KAMLESH by this US: 5/31/2018  KAMLESH by 1st US:  5/23/2018 - ER    Estimated Fetal Weight:  580 grams    Comments:   Impression       Single intrauterine pregnancy of an estimated gestational age of 23 weeks, 1 day with an estimated date of delivery of 5/31/2018.    Fetal survey within normal limits.         Lab Review  Lab:   Blood type: A     Recent Results (from the past 5880 hour(s))   URINALYSIS    Collection Time: 11/24/17  6:13 PM   Result Value Ref Range    Color Yellow     Character Cloudy (A)     Specific Gravity 1.006 <1.035    Ph 7.5 5.0 - 8.0    Glucose Negative Negative mg/dL    Ketones Negative Negative mg/dL    Protein Negative Negative mg/dL    Bilirubin Negative Negative    Urobilinogen, Urine 0.2 Negative    Nitrite Negative Negative    Leukocyte Esterase Large (A) Negative    Occult Blood Negative Negative    Micro Urine Req Microscopic    URINE MICROSCOPIC (W/UA)    Collection Time: 11/24/17  6:13 PM   Result Value Ref Range    WBC 20-50 (A) /hpf    RBC 0-2 /hpf    Bacteria Moderate (A) None /hpf    Epithelial Cells Many (A) /hpf    Hyaline Cast 3-5 (A) /lpf   BETA-HCG QUALITATIVE URINE    Collection Time: 11/24/17  6:13 PM   Result Value Ref Range    Beta-Hcg Urine Positive (A) Negative   REFRACTOMETER SG    Collection Time: 11/24/17  6:13 PM   Result Value Ref Range    Specific Gravity 1.004    URINE CULTURE (ADD ON)    Collection Time: 11/24/17  6:13 PM   Result Value Ref Range    Significant Indicator POS (POS)     Source UR     Site URINE, CLEAN CATCH     Urine Culture Mixed skin hira 10-50,000 cfu/mL (A)     Urine Culture Probable Gardnerella vaginalis  >100,000 cfu/mL   (A)    CBC WITH DIFFERENTIAL    Collection Time: 11/24/17  6:16 PM   Result Value Ref Range    WBC 13.1 (H) 4.8 - 10.8 K/uL    RBC 4.07 (L) 4.20 - 5.40 M/uL    Hemoglobin 12.9 12.0 - 16.0 g/dL    Hematocrit  37.3 37.0 - 47.0 %    MCV 91.6 81.4 - 97.8 fL    MCH 31.7 27.0 - 33.0 pg    MCHC 34.6 33.6 - 35.0 g/dL    RDW 41.6 35.9 - 50.0 fL    Platelet Count 217 164 - 446 K/uL    MPV 12.0 9.0 - 12.9 fL    Neutrophils-Polys 78.20 (H) 44.00 - 72.00 %    Lymphocytes 11.00 (L) 22.00 - 41.00 %    Monocytes 8.00 0.00 - 13.40 %    Eosinophils 1.90 0.00 - 6.90 %    Basophils 0.40 0.00 - 1.80 %    Immature Granulocytes 0.50 0.00 - 0.90 %    Nucleated RBC 0.00 /100 WBC    Neutrophils (Absolute) 10.28 (H) 2.00 - 7.15 K/uL    Lymphs (Absolute) 1.45 1.00 - 4.80 K/uL    Monos (Absolute) 1.05 (H) 0.00 - 0.85 K/uL    Eos (Absolute) 0.25 0.00 - 0.51 K/uL    Baso (Absolute) 0.05 0.00 - 0.12 K/uL    Immature Granulocytes (abs) 0.06 0.00 - 0.11 K/uL    NRBC (Absolute) 0.00 K/uL   COMP METABOLIC PANEL    Collection Time: 11/24/17  6:16 PM   Result Value Ref Range    Sodium 134 (L) 135 - 145 mmol/L    Potassium 3.3 (L) 3.6 - 5.5 mmol/L    Chloride 101 96 - 112 mmol/L    Co2 23 20 - 33 mmol/L    Anion Gap 10.0 0.0 - 11.9    Glucose 80 65 - 99 mg/dL    Bun 9 8 - 22 mg/dL    Creatinine 0.64 0.50 - 1.40 mg/dL    Calcium 9.6 8.5 - 10.5 mg/dL    AST(SGOT) 12 12 - 45 U/L    ALT(SGPT) 10 2 - 50 U/L    Alkaline Phosphatase 81 30 - 99 U/L    Total Bilirubin 0.4 0.1 - 1.5 mg/dL    Albumin 3.6 3.2 - 4.9 g/dL    Total Protein 7.8 6.0 - 8.2 g/dL    Globulin 4.2 (H) 1.9 - 3.5 g/dL    A-G Ratio 0.9 g/dL   LIPASE    Collection Time: 11/24/17  6:16 PM   Result Value Ref Range    Lipase 18 11 - 82 U/L   ESTIMATED GFR    Collection Time: 11/24/17  6:16 PM   Result Value Ref Range    GFR If African American >60 >60 mL/min/1.73 m 2    GFR If Non African American >60 >60 mL/min/1.73 m 2   URINALYSIS    Collection Time: 11/26/17 12:20 PM   Result Value Ref Range    Color Yellow     Character Clear     Specific Gravity 1.008 <1.035    Ph 7.5 5.0 - 8.0    Glucose Negative Negative mg/dL    Ketones Negative Negative mg/dL    Protein Negative Negative mg/dL    Bilirubin  Negative Negative    Urobilinogen, Urine 0.2 Negative    Nitrite Negative Negative    Leukocyte Esterase Negative Negative    Occult Blood Negative Negative    Micro Urine Req see below    CBC WITH DIFFERENTIAL    Collection Time: 11/27/17 10:44 AM   Result Value Ref Range    WBC 14.1 (H) 4.8 - 10.8 K/uL    RBC 4.34 4.20 - 5.40 M/uL    Hemoglobin 13.2 12.0 - 16.0 g/dL    Hematocrit 38.5 37.0 - 47.0 %    MCV 88.7 81.4 - 97.8 fL    MCH 30.4 27.0 - 33.0 pg    MCHC 34.3 33.6 - 35.0 g/dL    RDW 41.2 35.9 - 50.0 fL    Platelet Count 223 164 - 446 K/uL    MPV 11.7 9.0 - 12.9 fL    Nucleated RBC 0.00 /100 WBC    NRBC (Absolute) 0.00 K/uL    Neutrophils-Polys 87.80 (H) 44.00 - 72.00 %    Lymphocytes 4.30 (L) 22.00 - 41.00 %    Monocytes 7.00 0.00 - 13.40 %    Eosinophils 0.00 0.00 - 6.90 %    Basophils 0.90 0.00 - 1.80 %    Neutrophils (Absolute) 12.38 (H) 2.00 - 7.15 K/uL    Lymphs (Absolute) 0.61 (L) 1.00 - 4.80 K/uL    Monos (Absolute) 0.99 (H) 0.00 - 0.85 K/uL    Eos (Absolute) 0.00 0.00 - 0.51 K/uL    Baso (Absolute) 0.13 (H) 0.00 - 0.12 K/uL   BASIC METABOLIC PANEL    Collection Time: 11/27/17 10:44 AM   Result Value Ref Range    Sodium 132 (L) 135 - 145 mmol/L    Potassium 3.2 (L) 3.6 - 5.5 mmol/L    Chloride 100 96 - 112 mmol/L    Co2 23 20 - 33 mmol/L    Glucose 98 65 - 99 mg/dL    Bun 6 (L) 8 - 22 mg/dL    Creatinine 0.46 (L) 0.50 - 1.40 mg/dL    Calcium 8.9 8.5 - 10.5 mg/dL    Anion Gap 9.0 0.0 - 11.9   ESTIMATED GFR    Collection Time: 11/27/17 10:44 AM   Result Value Ref Range    GFR If African American >60 >60 mL/min/1.73 m 2    GFR If Non African American >60 >60 mL/min/1.73 m 2   DIFFERENTIAL MANUAL    Collection Time: 11/27/17 10:44 AM   Result Value Ref Range    Manual Diff Status PERFORMED    PERIPHERAL SMEAR REVIEW    Collection Time: 11/27/17 10:44 AM   Result Value Ref Range    Peripheral Smear Review see below    PLATELET ESTIMATE    Collection Time: 11/27/17 10:44 AM   Result Value Ref Range    Plt  Estimation Normal    MORPHOLOGY    Collection Time: 11/27/17 10:44 AM   Result Value Ref Range    RBC Morphology Normal    POCT Urinalysis    Collection Time: 12/26/17  1:31 PM   Result Value Ref Range    POC Color  Negative    POC Appearance  Negative    POC Leukocyte Esterase moderate Negative    POC Nitrites negative Negative    POC Urobiligen  Negative (0.2) mg/dL    POC Protein negative Negative mg/dL    POC Urine PH 7.0 5.0 - 8.0    POC Blood negative Negative    POC Specific Gravity 1.005 <1.005 - >1.030    POC Ketones negative Negative mg/dL    POC Bilirubin  Negative mg/dL    POC Glucose negative Negative mg/dL   THINPREP RFLX HPV ASCUS W/CTNG    Collection Time: 12/26/17  3:02 PM   Result Value Ref Range    Cytology Reg See Path Report     Source Cervical     C. trachomatis by PCR Negative Negative    N. gonorrhoeae by PCR Negative Negative   PREG CNTR PRENATAL PN    Collection Time: 12/26/17  3:18 PM   Result Value Ref Range    Color Yellow     Character Cloudy (A)     Specific Gravity 1.013 <1.035    Ph 7.0 5.0 - 8.0    Glucose Negative Negative mg/dL    Ketones 40 (A) Negative mg/dL    Protein Negative Negative mg/dL    Bilirubin Negative Negative    Urobilinogen, Urine 0.2 Negative    Nitrite Negative Negative    Leukocyte Esterase Large (A) Negative    Occult Blood Trace (A) Negative    Micro Urine Req Microscopic     Culture Indicated Yes UA Culture    WBC 12.1 (H) 4.8 - 10.8 K/uL    RBC 3.91 (L) 4.20 - 5.40 M/uL    Hemoglobin 12.1 12.0 - 16.0 g/dL    Hematocrit 36.1 (L) 37.0 - 47.0 %    MCV 92.3 81.4 - 97.8 fL    MCH 30.9 27.0 - 33.0 pg    MCHC 33.5 (L) 33.6 - 35.0 g/dL    RDW 46.5 35.9 - 50.0 fL    Platelet Count 181 164 - 446 K/uL    MPV 13.1 (H) 9.0 - 12.9 fL    Neutrophils-Polys 83.20 (H) 44.00 - 72.00 %    Lymphocytes 9.40 (L) 22.00 - 41.00 %    Monocytes 5.40 0.00 - 13.40 %    Eosinophils 0.60 0.00 - 6.90 %    Basophils 0.60 0.00 - 1.80 %    Immature Granulocytes 0.80 0.00 - 0.90 %     Nucleated RBC 0.00 /100 WBC    Neutrophils (Absolute) 10.04 (H) 2.00 - 7.15 K/uL    Lymphs (Absolute) 1.14 1.00 - 4.80 K/uL    Monos (Absolute) 0.65 0.00 - 0.85 K/uL    Eos (Absolute) 0.07 0.00 - 0.51 K/uL    Baso (Absolute) 0.07 0.00 - 0.12 K/uL    Immature Granulocytes (abs) 0.10 0.00 - 0.11 K/uL    NRBC (Absolute) 0.00 K/uL    Rubella IgG Antibody 47.40 IU/mL    Syphilis, Treponemal Qual Non Reactive Non Reactive    Hepatitis B Surface Antigen Negative Negative   AFP TETRA    Collection Time: 12/26/17  3:18 PM   Result Value Ref Range    AFP Value -Eia 59 ng/mL    AFP MOM Value 1.23     Hcg Value 88312 IU/L    Hcg Mom 0.91     Ue3 Value 1.42 ng/mL    Ue3 Mom 0.91     Interpretation Normal     M[age] KAMLESH [age] yr    Gestational Age Based On US     Gestational Age 18.14 weeks    Insulin Dependent Diabetes No     Race      Multiple Pregnancy One     Erica Value -Eia 131 pg/mL    Erica Mom Value 0.78     Maternal Weight 131 lbs    Err Maternal Scrn AFP See Note    HIV ANTIBODIES    Collection Time: 12/26/17  3:18 PM   Result Value Ref Range    HIV Ag/Ab Combo Assay Non Reactive Non Reactive   URINE CULTURE(NEW)    Collection Time: 12/26/17  3:18 PM   Result Value Ref Range    Significant Indicator POS (POS)     Source UR     Site      Urine Culture Mixed skin hira 10-50,000 cfu/mL (A)     Urine Culture Candida albicans  10-50,000 cfu/mL   (A)    URINE MICROSCOPIC (W/UA)    Collection Time: 12/26/17  3:18 PM   Result Value Ref Range    WBC 20-50 (A) /hpf    RBC 5-10 (A) /hpf    Bacteria Moderate (A) None /hpf    Epithelial Cells Moderate (A) /hpf    Budding Yeast Present (A) Absent /hpf   OP PRENATAL PANEL-BLOOD BANK    Collection Time: 12/26/17  3:20 PM   Result Value Ref Range    ABO Grouping Only A     Rh Grouping Only POS     Antibody Screen Scrn NEG    GLUCOSE 1HR GESTATIONAL    Collection Time: 03/17/18  8:33 AM   Result Value Ref Range    Glucose, Post Dose 104 70 - 139 mg/dL   HCT    Collection  Time: 03/17/18  8:33 AM   Result Value Ref Range    Hematocrit 37.1 37.0 - 47.0 %   HGB    Collection Time: 03/17/18  8:33 AM   Result Value Ref Range    Hemoglobin 12.1 12.0 - 16.0 g/dL   T.PALLIDUM AB EIA    Collection Time: 03/17/18  8:33 AM   Result Value Ref Range    Syphilis, Treponemal Qual Non Reactive Non Reactive   GRP B STREP, BY PCR (BRIZUELA BROTH)    Collection Time: 04/27/18 11:33 AM   Result Value Ref Range    Strep Gp B DNA PCR Negative Negative   FERN TEST    Collection Time: 06/02/18  6:30 PM   Result Value Ref Range    Fern Test On Amniotic Fluid see below Not present        Assessment:   1.  IUP at 40w5d  2.  Labor status: SROM and early latent labor  3.  Cat 1 FHTs  4.  Obstetrical history significant for   Patient Active Problem List    Diagnosis Date Noted   • Supervision of normal pregnancy 05/10/2018   • Supervision of normal first pregnancy in second trimester 12/26/2017   • Appendicitis with abscess 11/25/2017   .      Plan:     Admit to L&D  GBS negative  Routine labs  Pain management prn - pt desires natural labor.  Pitocin augmentation.

## 2018-06-03 NOTE — CARE PLAN
Problem: Alteration in comfort related to episiotomy, vaginal repair and/or after birth pains  Goal: Patient verbalizes acceptable pain level    Intervention: Assess effectiveness of pain meds within 1 hour of administration  Pain controlled with prn medications per mar. Pt will call to request pain medications. Will offer pain medications as they become available. Pain management expectations discussed with pt, plan made for the day regarding how to address pain.

## 2018-06-03 NOTE — PROGRESS NOTES
Pt. Arrived by wheelchair to postpartum  with belongings to room 336, received report from Arely SUTTON. Pt. Doing well. Funds firm, light sasha/rubra. Pt oriented to room, emergency call light and infant security. Pt. Aware of dangers of sleeping with infant. Call light within reach.

## 2018-06-03 NOTE — PROCEDURES
Delivery Note    PATIENT ID:  NAME:  Michelle Lima  MRN:               [id number]  YOB: 1990    Labor Course  Patient was admitted to Labor and Delivery at 40w6d for active labor and SROM. Pt progressed well.  RT present at delivery for MSAF.      On 6/3/2018  at 02:06, this 27 y.o.,  40w6d now   , GBS neg female delivered via OA/ under no anesthesia a viable female infant weighing 7 lbs and 5 oz with APGAR scores of 8 and 9 at one and five minutes. Cord loop x 1 around the legs/feet.  Baby handed off to RT/RN.  Spontaneous cry.  Mouth and nares bulb suctioned by RN/RT. Immediate cord clamping occurred with cord doubly clamped by myself and cut by myself.  Pitocin infusing in IVF.  Spontaneous delivery of [place] placenta grossly intact @ 02:11.  CVx3. FF and bleeding small.  Upon vaginal exam, there was a 2nd deg perineal laceration as well as bilateral labial lacerations which was repaired using 3.0 and 4-0 chromic in the usual sterile fashion after placement of 1% lidocaine. Pt and infant are stable and bonding.  Lap count: correct x 2 with Arely.  Estimated blood loss: less than 500mL.  At the end of the procedure, a small (2-3cm) hematoma was noted on the left labia.  Will obtain a stat CBC and ice the area, monitor closely.    Nicole Holder, JUDITH, APN  Dr. Heath, attending physician

## 2018-06-03 NOTE — PROGRESS NOTES
- Report received from PATRICE Orellana RN. Pt resting in bed. Family at bedside. POC discussed.    - SVE 4-5/90%/-1    2310- Update to KAYY Holder CNM, in unit regarding decel and baseline change.    - SVE 5/90%/-1. IUPC placed.    0130- SVE complete    0206-  of viable female infant. Apgars 8/9    0410- Pt declined use of BR at this time. Last void prior to delivery at 0200, straight cath- 100mL    0414- Pt and infant transferred to PPU unit via WC without incident accompanied by Suraj (patient's significant other). Report given to ROSE Villarreal RN. POC discussed. Bands and cuddles verified.

## 2018-06-03 NOTE — CONSULTS
Reviewed basics of breastfeeding, provided New Beginnings booklet. Assisted with latch onto both breasts in cross cradle position and taught hand expression, colostrum easily expressed bilaterally. Breasts are widely spaced, suspicious appearance for insufficient glandular tissue, will need close follow-up. Lactation to follow.

## 2018-06-04 VITALS
BODY MASS INDEX: 26.8 KG/M2 | HEIGHT: 64 IN | SYSTOLIC BLOOD PRESSURE: 98 MMHG | WEIGHT: 157 LBS | HEART RATE: 71 BPM | RESPIRATION RATE: 20 BRPM | DIASTOLIC BLOOD PRESSURE: 57 MMHG | OXYGEN SATURATION: 96 % | TEMPERATURE: 97.9 F

## 2018-06-04 PROCEDURE — 700102 HCHG RX REV CODE 250 W/ 637 OVERRIDE(OP): Performed by: NURSE PRACTITIONER

## 2018-06-04 PROCEDURE — A9270 NON-COVERED ITEM OR SERVICE: HCPCS | Performed by: NURSE PRACTITIONER

## 2018-06-04 RX ORDER — IBUPROFEN 800 MG/1
800 TABLET ORAL EVERY 8 HOURS PRN
Qty: 30 TAB | Refills: 0 | Status: SHIPPED | OUTPATIENT
Start: 2018-06-04

## 2018-06-04 RX ADMIN — Medication 1 TABLET: at 09:02

## 2018-06-04 RX ADMIN — IBUPROFEN 800 MG: 800 TABLET, FILM COATED ORAL at 06:22

## 2018-06-04 ASSESSMENT — PAIN SCALES - GENERAL
PAINLEVEL_OUTOF10: 2
PAINLEVEL_OUTOF10: 0
PAINLEVEL_OUTOF10: 4

## 2018-06-04 NOTE — PROGRESS NOTES
1900- Report received from LARRY Menon RN. Pt resting in bed. Infant sleeping in bassinet. Suraj (patient's significant other) and patient's younger brother at bedside. POC discussed.     0620- Pain adequately managed with PRN ibuprofen. Educated pt throughout shift regarding infant care and breastfeeding. Encouraged pt to use cell-phone alarm to wake up to feed infant regularly.     0700- Report given to MARLA Hauser RN. POC discussed.

## 2018-06-04 NOTE — DISCHARGE SUMMARY
Discharge Summary:      Michelle Lima      Admit Date:   2018  Discharge Date:  2018     Admitting diagnosis:  pregnancy  Labor and delivery, indication for care  Discharge Diagnosis: Status post vaginal, spontaneous.  Pregnancy Complications: none  Tubal Ligation:  no        History:  Past Medical History:   Diagnosis Date   • Allergy    • Urinary tract infection 2017     OB History    Para Term  AB Living   1             SAB TAB Ectopic Molar Multiple Live Births                    # Outcome Date GA Lbr Iftikhar/2nd Weight Sex Delivery Anes PTL Lv   1 Current                    Hydrocodone  Patient Active Problem List    Diagnosis Date Noted   • Supervision of normal pregnancy 05/10/2018   • Supervision of normal first pregnancy in second trimester 2017   • Appendicitis with abscess 2017        Hospital Course:   27 y.o. , now para 1, was admitted with the above mentioned diagnosis, underwent Active Labor, vaginal, spontaneous. Patient postpartum course was unremarkable, with progressive advancement in diet , ambulation and toleration of oral analgesia. Patient without complaints today and desires discharge.      Vitals:    18 0515 18 1000 18 1200 18   BP: (!) 92/49 (!) 99/62 (!) 98/54 (!) 97/53   Pulse: 67 75 71 66   Resp:    Temp: 36.8 °C (98.2 °F) 36.4 °C (97.5 °F) 37.2 °C (99 °F) 36.9 °C (98.5 °F)   TempSrc:       SpO2: 97% 97% 97% 97%   Weight:       Height:           Current Facility-Administered Medications   Medication Dose   • ondansetron (ZOFRAN ODT) dispertab 4 mg  4 mg    Or   • ondansetron (ZOFRAN) syringe/vial injection 4 mg  4 mg   • metoclopramide (REGLAN) injection 10 mg  10 mg   • oxytocin (PITOCIN) infusion (for postpartum)   mL/hr   • ibuprofen (MOTRIN) tablet 800 mg  800 mg   • acetaminophen (TYLENOL) tablet 325 mg  325 mg   • LR infusion     • PRN oxytocin (PITOCIN) (20 Units/1000 mL) PRN for  excessive uterine bleeding - See Admin Instr  125-999 mL/hr   • miSOPROStol (CYTOTEC) tablet 600 mcg  600 mcg   • methylergonovine (METHERGINE) injection 0.2 mg  0.2 mg   • docusate sodium (COLACE) capsule 100 mg  100 mg   • bisacodyl (DULCOLAX) suppository 10 mg  10 mg   • prenatal plus vitamin (STUARTNATAL 1+1) 27-1 MG tablet 1 Tab  1 Tab       Exam:  Breast Exam: negative  Abdomen: Abdomen soft, non-tender. BS normal. No masses,  No organomegaly  Fundus Non Tender: yes  Incision: none  Perineum: perineum intact  Extremity: extremities, peripheral pulses and reflexes normal     Labs:  Recent Labs      06/02/18   2030  06/03/18   0321  06/03/18   0945   WBC  12.3*  21.1*  16.3*   RBC  4.61  3.77*  3.47*   HEMOGLOBIN  14.3  11.8*  10.7*   HEMATOCRIT  42.5  33.9*  32.1*   MCV  92.2  89.9  92.5   MCH  31.0  31.3  30.8   MCHC  33.6  34.8  33.3*   RDW  48.6  46.2  48.1   PLATELETCT  146*  128*  116*   MPV  13.5*   --   13.6*        Activity:   Discharge to home  Pelvic Rest x 6 weeks    Assessment:  normal postpartum course  Discharge Assessment: No areas of skin breakdown/redness; surgical incision intact/healing     Follow [facility]: [facility] in 5 weeks for vaginal.      Discharge Meds:   Current Outpatient Prescriptions   Medication Sig Dispense Refill   • ibuprofen (MOTRIN) 800 MG Tab Take 1 Tab by mouth every 8 hours as needed (For cramping after delivery; do not give if patient is receiving ketorolac (Toradol)). 30 Tab 0       BRUCE Hernandez.

## 2018-06-04 NOTE — DISCHARGE INSTRUCTIONS
POSTPARTUM DISCHARGE INSTRUCTIONS FOR MOM    YOB: 1990   Age: 27 y.o.               Admit Date: 2018     Discharge Date: 2018  Attending Doctor:  Amanda Ornelas M.D.                  Allergies:  Hydrocodone    Discharged to home by car. Discharged via wheelchair, hospital escort: Yes.  Special equipment needed: Not Applicable  Belongings with: Personal  Be sure to schedule a follow-up appointment with your primary care doctor or any specialists as instructed.     Discharge Plan:   Influenza Vaccine Indication: Patient Refuses    REASONS TO CALL YOUR OBSTETRICIAN:  1.   Persistent fever or shaking chills (Temperature higher than 100.4)  2.   Heavy bleeding (soaking more than 1 pad per hour); Passing clots  3.   Foul odor from vagina  4.   Mastitis (Breast infection; breast pain, chills, fever, redness)  5.   Urinary pain, burning or frequency  6.   Episiotomy infection  7.   Abdominal incision infection  8.   Severe depression longer than 24 hours    HAND WASHING  · Prior to handling the baby.  · Before breastfeeding or bottle feeding baby.  · After using the bathroom or changing the baby's diaper.    WOUND CARE  Ask your physician for additional care instructions.  In general:    ·  Incision:      · Keep clean and dry.    · Do NOT lift anything heavier than your baby for up to 6 weeks.    · There should not be any opening or pus.      VAGINAL CARE  · Nothing inside vagina for 6 weeks: no sexual intercourse, tampons or douching.  · Bleeding may continue for 2-4 weeks.  Amount may vary.    · Call your physician for heavy bleeding which means soaking more than 1 pad per hour    BIRTH CONTROL  · It is possible to become pregnant at any time after delivery and while breastfeeding.  · Plan to discuss a method of birth control with your physician at your follow up visit. visit.    DIET AND ELIMINATION  · Eating more fiber (bran cereal, fruits, and vegetables) and drinking plenty of fluids  "will help to avoid constipation.  · Urinary frequency after childbirth is normal.    POSTPARTUM BLUES  During the first few days after birth, you may experience a sense of the \"blues\" which may include impatience, irritability or even crying.  These feeling come and go quickly.  However, as many as 1 in 10 women experience emotional symptoms known as postpartum depression.    Postpartum depression:  May start as early as the second or third day after delivery or take several weeks or months to develop.  Symptoms of \"blues\" are present, but are more intense:  Crying spells; loss of appetite; feelings of hopelessness or loss of control; fear of touching the baby; over concern or no concern at all about the baby; little or no concern about your own appearance/caring for yourself; and/or inability to sleep or excessive sleeping.  Contact your physician if you are experiencing any of these symptoms.    Crisis Hotline:  · [place] Crisis Hotline:  9-865-JJRQNYW  Or 1-850.377.4381  · [place] Crisis Hotline:  1-966.264.9350  Or 213-712-7398    PREVENTING SHAKEN BABY:  If you are angry or stressed, PUT THE BABY IN THE CRIB, step away, take some deep breaths, and wait until you are calm to care for the baby.  DO NOT SHAKE THE BABY.  You are not alone, call a supporter for help.    · Crisis Call Center 24/7 crisis line 640-885-5796 or 1-609.742.1955  · You can also text them, text \"ANSWER\" to 216571    QUIT SMOKING/TOBACCO USE:  I understand the use of any tobacco products increases my chance of suffering from future heart disease and could cause other illnesses which may shorten my life. Quitting the use of tobacco products is the single most important thing I can do to improve my health. For further information on smoking / tobacco cessation call a Toll Free Quit Line at 1-700.676.3257 (*[employer] [place]) or 1-931.474.7284 (American Lung Association) or you can access the web based program at " www.lungusa.org.    · [place] Tobacco Users Help Line:  (831) 836-1465       Toll Free: 1-477.865.4343  · Quit Tobacco Program [facility] (143)765-3428    DEPRESSION / SUICIDE RISK:  As you are discharged from this [facility], it is important to learn how to keep safe from harming yourself.    Recognize the warning signs:  · Abrupt changes in personality, positive or negative- including increase in energy   · Giving away possessions  · Change in eating patterns- significant weight changes-  positive or negative  · Change in sleeping patterns- unable to sleep or sleeping all the time   · Unwillingness or inability to communicate  · Depression  · Unusual sadness, discouragement and loneliness  · Talk of wanting to die  · Neglect of personal appearance   · Rebelliousness- reckless behavior  · Withdrawal from people/activities they love  · Confusion- inability to concentrate     If you or a loved one observes any of these behaviors or has concerns about self-harm, here's what you can do:  · Talk about it- your feelings and reasons for harming yourself  · Remove any means that you might use to hurt yourself (examples: pills, rope, extension cords, firearm)  · Get professional help from the community (Mental Health, Substance Abuse, psychological counseling)  · Do not be alone:Call your Safe Contact- someone whom you trust who will be there for you.  · Call your local CRISIS HOTLINE 445-4903 or 373-600-6832  · Call your local Children's Mobile Crisis Response Team [place] (893) 555-0886 or www.[employer]  · Call the toll free National Suicide Prevention Hotlines   · National Suicide Prevention Lifeline 613-815-AMSS (7641)  · National Hope Line Network 800-SUICIDE (908-7189)    DISCHARGE SURVEY:  Thank you for choosing [facility].  We hope we provided you with very good care.  You may be receiving a survey in the mail.  Please fill it out.  Your opinion is valuable to  us.    ADDITIONAL EDUCATIONAL MATERIALS GIVEN TO PATIENT:        My signature on this form indicates that:  1.  I have reviewed and understand the above information  2.  My questions regarding this information have been answered to my satisfaction.  3.  I have formulated a plan with my discharge nurse to obtain my prescribed medication for home.

## 2018-06-04 NOTE — CARE PLAN
Problem: Potential for postpartum infection related to presence of episiotomy/vaginal tear and/or uterine contamination  Goal: Patient will be absent from signs and symptoms of infection  Outcome: PROGRESSING AS EXPECTED  Pt is afebrile    Problem: Alteration in comfort related to episiotomy, vaginal repair and/or after birth pains  Goal: Patient is able to ambulate, care for self and infant    Intervention: Administer pain meds as requested by patient and ordered by MD/DO/CNM  Pain medication administered per MAR and at pt request. Pt also utilizing tucks pads and cooling spray for comfort.      Problem: Potential knowledge deficit related to lack of understanding of self and  care  Goal: Patient will demonstrate ability to care for self and infant    Intervention: Educate patient verbally, by demonstration, and with written and video material  Instructed pt on how to use bulb suction by demonstration. Encouraged pt to place baby on side in bassinet to allow oral secretions to drain from mouth.

## 2018-06-12 ENCOUNTER — TELEPHONE (OUTPATIENT)
Dept: OBGYN | Facility: CLINIC | Age: 28
End: 2018-06-12

## 2018-06-12 NOTE — TELEPHONE ENCOUNTER
Call to patient regarding FMLA paperwork that need top be filled out. Paperwork is for patient's mom. First page was already filled out and some informations is wrong. Patient states there is no need for this anymore. She will talk to her willam and if still needed will bring new set of forms.

## 2018-07-10 ENCOUNTER — POST PARTUM (OUTPATIENT)
Dept: OBGYN | Facility: CLINIC | Age: 28
End: 2018-07-10
Payer: COMMERCIAL

## 2018-07-10 VITALS
BODY MASS INDEX: 23.22 KG/M2 | HEIGHT: 64 IN | WEIGHT: 136 LBS | DIASTOLIC BLOOD PRESSURE: 60 MMHG | SYSTOLIC BLOOD PRESSURE: 108 MMHG

## 2018-07-10 DIAGNOSIS — Z34.03 ENCOUNTER FOR SUPERVISION OF NORMAL FIRST PREGNANCY IN THIRD TRIMESTER: ICD-10-CM

## 2018-07-10 PROCEDURE — 90050 PR POSTPARTUM VISIT: CPT | Performed by: NURSE PRACTITIONER

## 2018-07-10 RX ORDER — NORGESTIMATE AND ETHINYL ESTRADIOL 0.25-0.035
1 KIT ORAL DAILY
Qty: 28 TAB | Refills: 6 | Status: SHIPPED | OUTPATIENT
Start: 2018-07-10 | End: 2019-01-20 | Stop reason: SDUPTHER

## 2018-07-10 RX ORDER — CHLORAL HYDRATE 500 MG
CAPSULE ORAL
COMMUNITY

## 2018-07-10 NOTE — NON-PROVIDER
Pt here today for postpartum exam.  Delivery type:  on 6-3-18  Currently: Bottle feeding   Desired BCM: OCP's  Last pap: 17 normal  Phone # [phone]  Pt denies any problems

## 2018-07-10 NOTE — PROGRESS NOTES
Subjective   Subjective:    Michelle Lima is a 27 y.o. female who presents for her postpartum exam  S/p  with second degree laceration. Her prenatal course was uncomplicated. Her postpartum course was also uncomplicated. She denies dysuria, vaginal bleeding, odor, itching or breast problems. She is bottle feeding. She desires an OCP for her birth control method. Reports no sex prior to this appointment. Eating a regular diet without difficulty. Bowel movement are Normal.  The patient is not having any pain. No current menses. Patient Denies Incisional pain, drainage or redness. Patient denies postpartum depression.     Problem List     Patient Active Problem List    Diagnosis Date Noted   • Supervision of normal pregnancy 05/10/2018   • Supervision of normal first pregnancy in second trimester 2017   • Appendicitis with abscess 2017       Objective    See PE  Lab: H&H upon discharge 10.7/32.1  Weight - 136 lb  Vitals - 108/60    Assessment   Assessment:    1. PP care of lactating women   2. Exam WNL   3. Pap WNL   4. Desires contraception - OCP    Plan   Plan:    1. Breastfeeding support not applicable bottle feeding  2. Continue PNV   3. Contraceptive counseling - sprintec and start up instructions, consent. Script to pharmacy  4. Encouraged condom use for contraceptive start up and std protection if needed  5. Discussed diet, exercise and resumption of sexual activity   6. Preconception guidance for next pregnancy if applicable. No specific risk factors. Folic acid for all women of childbearing age.   7.  Follow up needed establish for gyn, continue annual screening  8.  Smoking/etoh/drug screening - states no exposure.

## 2018-07-10 NOTE — PROGRESS NOTES
"Subjective:      Michelle Lima is a 27 y.o. female who presents with Postpartum care (PP visit)            HPI    ROS       Objective:     /60   Ht 1.626 m (5' 4\")   Wt 61.7 kg (136 lb)   LMP 08/21/2017   Breastfeeding? No   BMI 23.34 kg/m²      Physical Exam   Constitutional: She is oriented to person, place, and time. She appears well-developed and well-nourished.   HENT:   Head: Normocephalic.   Eyes: Pupils are equal, round, and reactive to light.   Neck: Normal range of motion. No thyromegaly present.   Cardiovascular: Normal rate, regular rhythm and normal heart sounds.    Pulmonary/Chest: Effort normal and breath sounds normal.   Genitourinary: Vagina normal and uterus normal. No vaginal discharge found.   Musculoskeletal: Normal range of motion.   Neurological: She is alert and oriented to person, place, and time.   Skin: Skin is warm.   Psychiatric: She has a normal mood and affect. Her behavior is normal. Judgment and thought content normal.   Nursing note and vitals reviewed.              Assessment/Plan:     1. Encounter for supervision of normal first pregnancy in third trimester  resolved    2. Postpartum examination following vaginal delivery  Script for sprintec to pharmacy      "

## 2019-01-21 RX ORDER — NORGESTIMATE AND ETHINYL ESTRADIOL 0.25-0.035
KIT ORAL
Qty: 28 TAB | Refills: 6 | Status: SHIPPED | OUTPATIENT
Start: 2019-01-21

## 2021-10-15 ENCOUNTER — HOSPITAL ENCOUNTER (EMERGENCY)
Facility: MEDICAL CENTER | Age: 31
End: 2021-10-15
Attending: EMERGENCY MEDICINE
Payer: COMMERCIAL

## 2021-10-15 VITALS
RESPIRATION RATE: 15 BRPM | HEART RATE: 79 BPM | TEMPERATURE: 98 F | SYSTOLIC BLOOD PRESSURE: 108 MMHG | WEIGHT: 139 LBS | DIASTOLIC BLOOD PRESSURE: 70 MMHG | BODY MASS INDEX: 23.86 KG/M2 | OXYGEN SATURATION: 96 %

## 2021-10-15 DIAGNOSIS — N39.0 URINARY TRACT INFECTION WITHOUT HEMATURIA, SITE UNSPECIFIED: ICD-10-CM

## 2021-10-15 DIAGNOSIS — O03.9 SPONTANEOUS ABORTION: ICD-10-CM

## 2021-10-15 LAB
ALBUMIN SERPL BCP-MCNC: 4.6 G/DL (ref 3.2–4.9)
ALBUMIN/GLOB SERPL: 1.3 G/DL
ALP SERPL-CCNC: 84 U/L (ref 30–99)
ALT SERPL-CCNC: 34 U/L (ref 2–50)
ANION GAP SERPL CALC-SCNC: 12 MMOL/L (ref 7–16)
APPEARANCE UR: CLEAR
AST SERPL-CCNC: 28 U/L (ref 12–45)
BACTERIA #/AREA URNS HPF: ABNORMAL /HPF
BASOPHILS # BLD AUTO: 0.7 % (ref 0–1.8)
BASOPHILS # BLD: 0.05 K/UL (ref 0–0.12)
BILIRUB SERPL-MCNC: 1.3 MG/DL (ref 0.1–1.5)
BILIRUB UR QL STRIP.AUTO: NEGATIVE
BUN SERPL-MCNC: 13 MG/DL (ref 8–22)
CALCIUM SERPL-MCNC: 9.7 MG/DL (ref 8.5–10.5)
CHLORIDE SERPL-SCNC: 103 MMOL/L (ref 96–112)
CO2 SERPL-SCNC: 26 MMOL/L (ref 20–33)
COLOR UR: YELLOW
CREAT SERPL-MCNC: 0.63 MG/DL (ref 0.5–1.4)
EOSINOPHIL # BLD AUTO: 0.17 K/UL (ref 0–0.51)
EOSINOPHIL NFR BLD: 2.3 % (ref 0–6.9)
EPI CELLS #/AREA URNS HPF: ABNORMAL /HPF
ERYTHROCYTE [DISTWIDTH] IN BLOOD BY AUTOMATED COUNT: 44.6 FL (ref 35.9–50)
GLOBULIN SER CALC-MCNC: 3.6 G/DL (ref 1.9–3.5)
GLUCOSE SERPL-MCNC: 114 MG/DL (ref 65–99)
GLUCOSE UR STRIP.AUTO-MCNC: NEGATIVE MG/DL
HCG SERPL QL: NEGATIVE
HCT VFR BLD AUTO: 40.3 % (ref 37–47)
HGB BLD-MCNC: 13.6 G/DL (ref 12–16)
HYALINE CASTS #/AREA URNS LPF: ABNORMAL /LPF
IMM GRANULOCYTES # BLD AUTO: 0.04 K/UL (ref 0–0.11)
IMM GRANULOCYTES NFR BLD AUTO: 0.6 % (ref 0–0.9)
KETONES UR STRIP.AUTO-MCNC: NEGATIVE MG/DL
LEUKOCYTE ESTERASE UR QL STRIP.AUTO: ABNORMAL
LYMPHOCYTES # BLD AUTO: 1.34 K/UL (ref 1–4.8)
LYMPHOCYTES NFR BLD: 18.5 % (ref 22–41)
MCH RBC QN AUTO: 31.3 PG (ref 27–33)
MCHC RBC AUTO-ENTMCNC: 33.7 G/DL (ref 33.6–35)
MCV RBC AUTO: 92.6 FL (ref 81.4–97.8)
MICRO URNS: ABNORMAL
MONOCYTES # BLD AUTO: 0.46 K/UL (ref 0–0.85)
MONOCYTES NFR BLD AUTO: 6.3 % (ref 0–13.4)
NEUTROPHILS # BLD AUTO: 5.19 K/UL (ref 2–7.15)
NEUTROPHILS NFR BLD: 71.6 % (ref 44–72)
NITRITE UR QL STRIP.AUTO: NEGATIVE
NRBC # BLD AUTO: 0 K/UL
NRBC BLD-RTO: 0 /100 WBC
PH UR STRIP.AUTO: 8 [PH] (ref 5–8)
PLATELET # BLD AUTO: 207 K/UL (ref 164–446)
PMV BLD AUTO: 12.2 FL (ref 9–12.9)
POTASSIUM SERPL-SCNC: 3.8 MMOL/L (ref 3.6–5.5)
PROT SERPL-MCNC: 8.2 G/DL (ref 6–8.2)
PROT UR QL STRIP: NEGATIVE MG/DL
RBC # BLD AUTO: 4.35 M/UL (ref 4.2–5.4)
RBC # URNS HPF: ABNORMAL /HPF
RBC UR QL AUTO: ABNORMAL
SODIUM SERPL-SCNC: 141 MMOL/L (ref 135–145)
SP GR UR STRIP.AUTO: 1.01
UROBILINOGEN UR STRIP.AUTO-MCNC: 0.2 MG/DL
WBC # BLD AUTO: 7.3 K/UL (ref 4.8–10.8)
WBC #/AREA URNS HPF: ABNORMAL /HPF

## 2021-10-15 PROCEDURE — 81001 URINALYSIS AUTO W/SCOPE: CPT

## 2021-10-15 PROCEDURE — 85025 COMPLETE CBC W/AUTO DIFF WBC: CPT

## 2021-10-15 PROCEDURE — 87086 URINE CULTURE/COLONY COUNT: CPT

## 2021-10-15 PROCEDURE — 80053 COMPREHEN METABOLIC PANEL: CPT

## 2021-10-15 PROCEDURE — 84703 CHORIONIC GONADOTROPIN ASSAY: CPT

## 2021-10-15 PROCEDURE — 99284 EMERGENCY DEPT VISIT MOD MDM: CPT

## 2021-10-15 RX ORDER — CEFDINIR 300 MG/1
300 CAPSULE ORAL 2 TIMES DAILY
Qty: 14 CAPSULE | Refills: 0 | Status: SHIPPED | OUTPATIENT
Start: 2021-10-15

## 2021-10-15 NOTE — LETTER
Abhishek Patterson accompanied Michelle Lima to the emergency department on 10/15/2021. They may return to work on 10/16/2021.    If you have any questions or concerns, please don't hesitate to call.      HERMAN Long

## 2021-10-15 NOTE — ED PROVIDER NOTES
ED Provider Note    CHIEF COMPLAINT  Chief Complaint   Patient presents with   • Abdominal Cramping     poss pregnant    • Abdominal Pain       HPI  Michelle Lima is a 30 y.o. female who presents with crampy low abdominal pain and no bleeding other than scant brown discharge.  She is a G2, P1 LMP the first week of September has had a positive pregnancy test at home and then declining hCGs with Dr. Priti paiz this week.  She had an ultrasound yesterday that demonstrated no IUP and she has a follow-up hCG next week and a follow-up with Dr. Priti paiz.  Current pain is crampy and fairly generalized in the abdomen.  Denies fever nausea vomiting diarrhea constipation dysuria urgency or frequency.    REVIEW OF SYSTEMS  Pertinent positives include: Abdominal pain, pregnancy, declining hCGs, no IUP and uterus.  Pertinent negatives include: Fever, cough, coronavirus, loss of taste or smell.  10+ systems reviewed and negative.      PAST MEDICAL HISTORY  Past Medical History:   Diagnosis Date   • Allergy    • Urinary tract infection 12/2017       FAMILY HISTORY  Family History   Problem Relation Age of Onset   • Hypertension Mother    • Diabetes Father    • Hypertension Father    • Heart Disease Father    • Cancer Maternal Grandmother         ovarian cancer   • Cancer Maternal Grandfather    • No Known Problems Paternal Grandmother    • Psychiatric Illness Paternal Grandfather    • Heart Disease Paternal Grandfather    • Hypertension Paternal Grandfather    • Hypertension Brother        SOCIAL HISTORY  Social History     Tobacco Use   • Smoking status: Never Smoker   • Smokeless tobacco: Never Used   Substance Use Topics   • Alcohol use: No   • Drug use: No     Social History     Substance and Sexual Activity   Drug Use No       SURGICAL HISTORY  Past Surgical History:   Procedure Laterality Date   • APPENDECTOMY LAPAROSCOPIC  11/24/2017    Procedure: ATTEMPTED LAPAROSCOPIC APPENDECTOMY CONVERTED TO OPEN CECECTOMY;   Surgeon: Pascual Smith M.D.;  Location: SURGERY [facility];  Service: General   • APPENDECTOMY         CURRENT MEDICATIONS  No current facility-administered medications for this encounter.     Current Outpatient Medications   Medication Sig Dispense Refill   • FEMYNOR 0.25-35 MG-MCG per tablet TAKE 1 TABLET BY MOUTH EVERY DAY 28 Tab 6   • Omega-3 1000 MG Cap Take  by mouth.     • ibuprofen (MOTRIN) 800 MG Tab Take 1 Tab by mouth every 8 hours as needed (For cramping after delivery; do not give if patient is receiving ketorolac (Toradol)). 30 Tab 0   • Prenatal MV-Min-Fe Fum-FA-DHA (PRENATAL 1 PO) Take  by mouth.         ALLERGIES  Allergies   Allergen Reactions   • Hydrocodone Itching     Pt states was given for her appendix removal and started feeling itchy all over her body.        PHYSICAL EXAM  VITAL SIGNS: /67   Pulse 66   Temp 36.7 °C (98.1 °F) (Temporal)   Resp 18   Wt 63 kg (139 lb)   SpO2 99%   BMI 23.86 kg/m²   Reviewed and afebrile  Constitutional: Well developed, Well nourished, well-appearing, appears to have at least moderate pain.  HENT: Normocephalic, atraumatic, bilateral external ears normal, Wearing mask.   Eyes: PERRLA, conjunctiva pink, no scleral icterus.   Cardiovascular: Regular S1-S2 without murmur, rub, gallop.  No dependent edema or calf tenderness.  Respiratory: No rales, rhonchi, wheeze or cough.  Gastrointestinal: Soft, minimal diffuse abdominal tenderness without rebound guarding distention or masses, nondistended, no organomegaly.  Skin: No erythema, no rash.   Genitourinary:  No costovertebral angle tenderness.  Palpable uterus in the abdomen  Neurologic: Alert & oriented x 3, cranial nerves 2-12 intact by passive exam.  No focal deficit noted.  Psychiatric: Affect normal, Judgment normal, Mood normal.     DIFFERENTIAL DIAGNOSIS:  Attending's , ectopic pregnancy, gastroenteritis, doubt diverticulitis, UTI, bacteriuria.    RADIOLOGY/PROCEDURES  Ultrasound  nondiagnostic yesterday with Dr. Priti paiz    LABORATORY:  Results for orders placed or performed during the hospital encounter of 10/15/21   CBC WITH DIFFERENTIAL   Result Value Ref Range    WBC 7.3 4.8 - 10.8 K/uL    RBC 4.35 4.20 - 5.40 M/uL    Hemoglobin 13.6 12.0 - 16.0 g/dL    Hematocrit 40.3 37.0 - 47.0 %    MCV 92.6 81.4 - 97.8 fL    MCH 31.3 27.0 - 33.0 pg    MCHC 33.7 33.6 - 35.0 g/dL    RDW 44.6 35.9 - 50.0 fL    Platelet Count 207 164 - 446 K/uL    MPV 12.2 9.0 - 12.9 fL    Neutrophils-Polys 71.60 44.00 - 72.00 %    Lymphocytes 18.50 (L) 22.00 - 41.00 %    Monocytes 6.30 0.00 - 13.40 %    Eosinophils 2.30 0.00 - 6.90 %    Basophils 0.70 0.00 - 1.80 %    Immature Granulocytes 0.60 0.00 - 0.90 %    Nucleated RBC 0.00 /100 WBC    Neutrophils (Absolute) 5.19 2.00 - 7.15 K/uL    Lymphs (Absolute) 1.34 1.00 - 4.80 K/uL    Monos (Absolute) 0.46 0.00 - 0.85 K/uL    Eos (Absolute) 0.17 0.00 - 0.51 K/uL    Baso (Absolute) 0.05 0.00 - 0.12 K/uL    Immature Granulocytes (abs) 0.04 0.00 - 0.11 K/uL    NRBC (Absolute) 0.00 K/uL   COMP METABOLIC PANEL   Result Value Ref Range    Sodium 141 135 - 145 mmol/L    Potassium 3.8 3.6 - 5.5 mmol/L    Chloride 103 96 - 112 mmol/L    Co2 26 20 - 33 mmol/L    Anion Gap 12.0 7.0 - 16.0    Glucose 114 (H) 65 - 99 mg/dL    Bun 13 8 - 22 mg/dL    Creatinine 0.63 0.50 - 1.40 mg/dL    Calcium 9.7 8.5 - 10.5 mg/dL    AST(SGOT) 28 12 - 45 U/L    ALT(SGPT) 34 2 - 50 U/L    Alkaline Phosphatase 84 30 - 99 U/L    Total Bilirubin 1.3 0.1 - 1.5 mg/dL    Albumin 4.6 3.2 - 4.9 g/dL    Total Protein 8.2 6.0 - 8.2 g/dL    Globulin 3.6 (H) 1.9 - 3.5 g/dL    A-G Ratio 1.3 g/dL   HCG QUAL SERUM   Result Value Ref Range    Beta-Hcg Qualitative Serum Negative Negative   URINALYSIS    Specimen: Urine, Clean Catch   Result Value Ref Range    Color Yellow     Character Clear     Specific Gravity 1.008 <1.035    Ph 8.0 5.0 - 8.0    Glucose Negative Negative mg/dL    Ketones Negative Negative mg/dL     Protein Negative Negative mg/dL    Bilirubin Negative Negative    Urobilinogen, Urine 0.2 Negative    Nitrite Negative Negative    Leukocyte Esterase Large (A) Negative    Occult Blood Small (A) Negative    Micro Urine Req Microscopic    URINE MICROSCOPIC (W/UA)   Result Value Ref Range    WBC 10-20 (A) /hpf    RBC 0-2 /hpf    Bacteria Few (A) None /hpf    Epithelial Cells Few /hpf    Hyaline Cast 0-2 /lpf       COURSE & MEDICAL DECISION MAKING  This patient presents with abdominal pain and may have a UTI or bacteriuria.  She has had a recent pregnancy but her hCG is now gone to 0 so this represents a spontaneous .  Ectopic pregnancy is extremely unlikely.  She is already had appendicitis.  There is no diverticulitis..    This patient has borderline or elevated blood pressure as recorded above and was instructed to followup with primary physician for comprehensive blood pressure evaluation and yearly fasting cholesterol assessment according to to CMS protocol.    PLAN:  Discharge Medication List as of 10/15/2021  2:57 PM      START taking these medications    Details   cefdinir (OMNICEF) 300 MG Cap Take 1 Capsule by mouth 2 times a day., Disp-14 Capsule, R-0, Normal             Follow up for hCG and repeat appointment with Dr. Priti paiz as planned next week  Spontaneous  handout given  Return for severe pain, fever, dizziness, ill appearance    Twila Palacio M.D.  [address] [place] [address] [id number] [phone]      as scheduled      CONDITION: Stable.    FINAL IMPRESSION  1. Spontaneous     2. Urinary tract infection without hematuria, site unspecified          Electronically signed by: Artie Pedro M.D., 10/15/2021 2:18 PM

## 2021-10-15 NOTE — ED TRIAGE NOTES
"Pt to triage, pt tearful, c/o abd cramping/ abd pain. States \" she was seen by Dr. Alvarado OB yesterday, had an US done and there was no embryo noted. Pt has had scant vaginal bleeding, states her \" blood work showed she had possibly bene pregnant. Pt also dx'd with UTI previously   "

## 2021-10-15 NOTE — DISCHARGE INSTRUCTIONS
Take ibuprofen 600mg or naproxen 440mg 3 times a day as needed for pain and add tylenol if needed for persistent pain.  Take antibiotic for possible UTI and followup with Dr. MARSH as planned.

## 2021-10-15 NOTE — ED NOTES
Patient to ED room via wheelchair with s/o. Pt positioned on cart for comfort, updated on pending ERP evaluation & POC. Pt denies needs or questions, call light within reach, cart in low, locked position.

## 2021-10-17 LAB
BACTERIA UR CULT: NORMAL
SIGNIFICANT IND 70042: NORMAL
SITE SITE: NORMAL
SOURCE SOURCE: NORMAL

## 2024-07-28 ENCOUNTER — APPOINTMENT (OUTPATIENT)
Dept: URGENT CARE | Facility: PHYSICIAN GROUP | Age: 34
End: 2024-07-28

## (undated) DEVICE — DETERGENT RENUZYME PLUS 10 OZ PACKET (50/BX)

## (undated) DEVICE — STAPLE 75MM LINEAR (12EA/BX)

## (undated) DEVICE — SET SUCTION/IRRIGATION WITH DISPOSABLE TIP (6/CA )PART #0250-070-520 IS A SUB

## (undated) DEVICE — NEPTUNE 4 PORT MANIFOLD - (20/PK)

## (undated) DEVICE — SLEEVE, VASO, THIGH, MED

## (undated) DEVICE — BLADE SURGICAL #10 - (50/BX)

## (undated) DEVICE — NEEDLE INSFL 120MM 14GA VRRS - (20/BX)

## (undated) DEVICE — KIT ANESTHESIA W/CIRCUIT & 3/LT BAG W/FILTER (20EA/CA)

## (undated) DEVICE — SUTURE GENERAL

## (undated) DEVICE — ELECTRODE 850 FOAM ADHESIVE - HYDROGEL RADIOTRNSPRNT (50/PK)

## (undated) DEVICE — DRESSING TRANSPARENT FILM TEGADERM 2.375 X 2.75"  (100EA/BX)"

## (undated) DEVICE — GLOVE BIOGEL SZ 7.5 SURGICAL PF LTX - (50PR/BX 4BX/CA)

## (undated) DEVICE — CANNULA W/SEAL 5X100 Z-THRE - ADED KII (12/BX)

## (undated) DEVICE — SUTURE 0 VICRYL PLUS CT-2 - 27 INCH (36/BX)

## (undated) DEVICE — SODIUM CHL IRRIGATION 0.9% 1000ML (12EA/CA)

## (undated) DEVICE — STAPLER 75MM LINEAR OPEN (3EA/BX)

## (undated) DEVICE — GLOVE BIOGEL SZ 8 SURGICAL PF LTX - (50PR/BX 4BX/CA)

## (undated) DEVICE — BLANKET WARMING UPPER BODY - (10/CA)

## (undated) DEVICE — TROCAR Z THREAD12MM OPTICAL - NON BLADED (6/BX)

## (undated) DEVICE — BAG RETRIEVAL 10ML (10EA/BX)

## (undated) DEVICE — PAD LAP STERILE 18 X 18 - (5/PK 40PK/CA)

## (undated) DEVICE — TUBING CLEARLINK DUO-VENT - C-FLO (48EA/CA)

## (undated) DEVICE — STAPLER 45MM ARTICULATING - ENDO (3EA/BX)

## (undated) DEVICE — PROTECTOR ULNA NERVE - (36PR/CA)

## (undated) DEVICE — STERI STRIP COMPOUND BENZOIN - TINCTURE 0.6ML WITH APPLICATOR (40EA/BX)

## (undated) DEVICE — SUTURE 4-0 30CM STRATAFIX SPIRAL PS-2 (12EA/BX)

## (undated) DEVICE — CLOSURE SKIN STRIP 1/2 X 4 IN - (STERI STRIP) (50/BX 4BX/CA)

## (undated) DEVICE — TROCAR 5X100 NON BLADED Z-TH - READ KII (6/BX)

## (undated) DEVICE — CANISTER SUCTION 3000ML MECHANICAL FILTER AUTO SHUTOFF MEDI-VAC NONSTERILE LF DISP  (40EA/CA)

## (undated) DEVICE — SUTURE 0 COATED VICRYL 6-18IN - (12PK/BX)

## (undated) DEVICE — STAPLER 60MM BLUE 3.5MM WITH - STAPLE (3EA/BX)

## (undated) DEVICE — GLOVE BIOGEL SZ 6 PF LATEX - (50EA/BX 4BX/CA)

## (undated) DEVICE — GOWN WARMING STANDARD FLEX - (30/CA)

## (undated) DEVICE — APPLIER 5MM MED/LARGE CLIP - (3/BX)

## (undated) DEVICE — SUCTION INSTRUMENT YANKAUER OPEN TIP W/O VENT (50EA/CA)

## (undated) DEVICE — PACK LAP CHOLE OR - (2EA/CA)

## (undated) DEVICE — LACTATED RINGERS INJ 1000 ML - (14EA/CA 60CA/PF)

## (undated) DEVICE — SUTURE 1 VICRYL PLUSCT-1 27IN - (36/BX)

## (undated) DEVICE — MASK ANESTHESIA ADULT  - (100/CA)

## (undated) DEVICE — CHLORAPREP 26 ML APPLICATOR - ORANGE TINT(25/CA)

## (undated) DEVICE — SPONGE GAUZESTER 4 X 4 4PLY - (128PK/CA)

## (undated) DEVICE — SENSOR SPO2 NEO LNCS ADHESIVE (20/BX) SEE USER NOTES

## (undated) DEVICE — ELECTRODE DUAL RETURN W/ CORD - (50/PK)

## (undated) DEVICE — SET EXTENSION WITH 2 PORTS (48EA/CA) ***PART #2C8610 IS A SUBSTITUTE*****

## (undated) DEVICE — GLOVE BIOGEL INDICATOR SZ 7.5 SURGICAL PF LTX - (50PR/BX 4BX/CA)

## (undated) DEVICE — DRESSING TRANSPARENT FILM TEGADERM 4 X 4.75" (50EA/BX)"

## (undated) DEVICE — HEAD HOLDER JUNIOR/ADULT

## (undated) DEVICE — KIT ROOM DECONTAMINATION

## (undated) DEVICE — SUCTION INSTRUMENT YANKAUER BULBOUS TIP W/O VENT (50EA/CA)

## (undated) DEVICE — STAPLE 45MM BLUE 3.5MM ECHELON (12EA/BX)

## (undated) DEVICE — SUTURE 4-0 MONOCRYL PLUS PS-2 - 27 INCH (36/BX)

## (undated) DEVICE — SET LEADWIRE 5 LEAD BEDSIDE DISPOSABLE ECG (1SET OF 5/EA)

## (undated) DEVICE — SUTURE 3-0 VICRYL PLUS SH - 8X 18 INCH (12/BX)